# Patient Record
Sex: FEMALE | Race: WHITE | NOT HISPANIC OR LATINO | Employment: FULL TIME | URBAN - METROPOLITAN AREA
[De-identification: names, ages, dates, MRNs, and addresses within clinical notes are randomized per-mention and may not be internally consistent; named-entity substitution may affect disease eponyms.]

---

## 2017-01-03 ENCOUNTER — OFFICE VISIT (OUTPATIENT)
Dept: FAMILY MEDICINE | Facility: CLINIC | Age: 42
End: 2017-01-03
Payer: COMMERCIAL

## 2017-01-03 VITALS
SYSTOLIC BLOOD PRESSURE: 100 MMHG | HEIGHT: 64 IN | OXYGEN SATURATION: 99 % | DIASTOLIC BLOOD PRESSURE: 67 MMHG | BODY MASS INDEX: 23.56 KG/M2 | TEMPERATURE: 98.7 F | HEART RATE: 79 BPM | WEIGHT: 138 LBS

## 2017-01-03 DIAGNOSIS — R30.0 DYSURIA: Primary | ICD-10-CM

## 2017-01-03 DIAGNOSIS — N30.01 ACUTE CYSTITIS WITH HEMATURIA: ICD-10-CM

## 2017-01-03 DIAGNOSIS — R82.90 NONSPECIFIC FINDING ON EXAMINATION OF URINE: ICD-10-CM

## 2017-01-03 LAB
ALBUMIN UR-MCNC: NEGATIVE MG/DL
APPEARANCE UR: CLEAR
BACTERIA #/AREA URNS HPF: ABNORMAL /HPF
BILIRUB UR QL STRIP: NEGATIVE
COLOR UR AUTO: YELLOW
GLUCOSE UR STRIP-MCNC: 100 MG/DL
HGB UR QL STRIP: ABNORMAL
KETONES UR STRIP-MCNC: NEGATIVE MG/DL
LEUKOCYTE ESTERASE UR QL STRIP: ABNORMAL
NITRATE UR QL: POSITIVE
NON-SQ EPI CELLS #/AREA URNS LPF: ABNORMAL /LPF
PH UR STRIP: 6.5 PH (ref 5–7)
RBC #/AREA URNS AUTO: ABNORMAL /HPF (ref 0–2)
SP GR UR STRIP: 1.01 (ref 1–1.03)
URN SPEC COLLECT METH UR: ABNORMAL
UROBILINOGEN UR STRIP-ACNC: 0.2 EU/DL (ref 0.2–1)
WBC #/AREA URNS AUTO: ABNORMAL /HPF (ref 0–2)

## 2017-01-03 PROCEDURE — 87086 URINE CULTURE/COLONY COUNT: CPT | Performed by: PHYSICIAN ASSISTANT

## 2017-01-03 PROCEDURE — 81001 URINALYSIS AUTO W/SCOPE: CPT | Performed by: PHYSICIAN ASSISTANT

## 2017-01-03 PROCEDURE — 99213 OFFICE O/P EST LOW 20 MIN: CPT | Performed by: PHYSICIAN ASSISTANT

## 2017-01-03 RX ORDER — SULFAMETHOXAZOLE/TRIMETHOPRIM 800-160 MG
1 TABLET ORAL 2 TIMES DAILY
Qty: 6 TABLET | Refills: 0 | Status: SHIPPED | OUTPATIENT
Start: 2017-01-03 | End: 2017-01-06

## 2017-01-03 RX ORDER — PHENAZOPYRIDINE HYDROCHLORIDE 100 MG/1
100 TABLET, FILM COATED ORAL 3 TIMES DAILY PRN
Qty: 12 TABLET | Refills: 0 | Status: SHIPPED | OUTPATIENT
Start: 2017-01-03 | End: 2018-03-02

## 2017-01-03 NOTE — PROGRESS NOTES
SUBJECTIVE:                                                    Sis Aguirre is a 41 year old female who presents to clinic today for the following health issues:      URINARY TRACT SYMPTOMS     Onset: today     Description:   Painful urination (Dysuria): YES  Blood in urine (Hematuria): YES  Delay in urine (Hesitency): YES    Intensity: moderate    Progression of Symptoms:  constant    Accompanying Signs & Symptoms:  Fever/chills: no   Flank pain no   Nausea and vomiting: no   Any vaginal symptoms: none  Abdominal/Pelvic Pain: YES   History:   History of frequent UTI's: YES, last was 2 years ago  History of kidney stones: no   Sexually Active: YES  Possibility of pregnancy: No    Precipitating factors:   None          Therapies Tried and outcome: AZO and Cranberry juice prn     no vaginal bleeding or back pain, no f/c, n/v/d        Problem list and histories reviewed & adjusted, as indicated.  Additional history: as documented    Patient Active Problem List   Diagnosis     CARDIOVASCULAR SCREENING; LDL GOAL LESS THAN 160     Seasonal allergies     Psychosexual disorder     Major depression     Counseling for marital and partner problems     ADHD (attention deficit hyperactivity disorder)     Major depressive disorder, recurrent episode, moderate (H)     Major depressive disorder, recurrent episode, in partial or unspecified remission     Alopecia     Insomnia     Fibromyalgia     S/P hysterectomy     Past Surgical History   Procedure Laterality Date     Hc cystoscopy w dil urethral stricture/calibration       young child and adult     Orthopedic surgery       CORTISONE INJECTION RIGHT SHOULDER     Laparoscopic hysterectomy total, salpingectomy bilateral Bilateral 12/30/2015     Procedure: LAPAROSCOPIC HYSTERECTOMY TOTAL, SALPINGECTOMY BILATERAL;  Surgeon: Karely Olmedo MD;  Location:  OR     Cystoscopy N/A 12/30/2015     Procedure: CYSTOSCOPY;  Surgeon: Geronimo Yu MD;  Location:  OR      Hysterectomy, pap no longer indicated  2015       Social History   Substance Use Topics     Smoking status: Never Smoker      Smokeless tobacco: Never Used     Alcohol Use: 1.2 oz/week     2 Standard drinks or equivalent per week      Comment: 1 DRINK DAILY     Family History   Problem Relation Age of Onset     Adopted: Yes     CANCER Father 68     Esophageal cancer,      Obesity Father      Obesity Mother      Parkinsonism Father 62     Other - See Comments Brother      kidney stones     Other - See Comments Father      Blood clots/kidney stones     Asthma Mother      Arthritis Mother      Other - See Comments Mother      gallbladder         Current Outpatient Prescriptions   Medication Sig Dispense Refill     sulfamethoxazole-trimethoprim (BACTRIM DS/SEPTRA DS) 800-160 MG per tablet Take 1 tablet by mouth 2 times daily for 3 days 6 tablet 0     phenazopyridine (PYRIDIUM) 100 MG tablet Take 1 tablet (100 mg) by mouth 3 times daily as needed for urinary tract discomfort 12 tablet 0     ibuprofen (ADVIL,MOTRIN) 600 MG tablet Take 1 tablet (600 mg) by mouth every 6 hours as needed for moderate pain 180 tablet 0     ferrous sulfate (IRON) 325 (65 FE) MG tablet Take 1 tablet (325 mg) by mouth 2 times daily 60 tablet 2     ACETAMINOPHEN PO Take 1,000 mg by mouth every 6 hours as needed        Omega-3 Fatty Acids (OMEGA-3 FISH OIL PO) Take 1 g by mouth daily        Amphetamine-Dextroamphetamine (ADDERALL PO) Take 20 mg by mouth daily        venlafaxine (EFFEXOR-XR) 75 MG 24 hr capsule Take 1 capsule (75 mg) by mouth daily 30 capsule 4     clonazePAM (KLONOPIN) 1 MG tablet Take 1 tablet (1 mg) by mouth daily Take one and a half at night before bed (Patient taking differently: Take 1 mg by mouth At Bedtime ) 135 tablet 3     ValACYclovir HCl (VALTREX PO) Take 500 mg by mouth daily as needed        Calcium-Vitamin D-Vitamin K (CALCIUM SOFT CHEWS PO) Take 500 mg by mouth. Also has 500 IU of Vit D and 40 MCG of  "Vit K.        No Known Allergies    ROS:  Constitutional, HEENT, cardiovascular, pulmonary, gi and gu systems are negative, except as otherwise noted.    OBJECTIVE:                                                    /67 mmHg  Pulse 79  Temp(Src) 98.7  F (37.1  C) (Tympanic)  Ht 5' 3.5\" (1.613 m)  Wt 138 lb (62.596 kg)  BMI 24.06 kg/m2  SpO2 99%  LMP 10/05/2015 (Exact Date)  Body mass index is 24.06 kg/(m^2).  GENERAL: healthy, alert and no distress  RESP: lungs clear to auscultation - no rales, rhonchi or wheezes  CV: regular rate and rhythm, normal S1 S2, no S3 or S4, no murmur, click or rub  ABDOMEN: soft, nontender, no hepatosplenomegaly, no masses and bowel sounds normal  BACK: no CVA tenderness  PSYCH: mentation appears normal, affect normal/bright    Diagnostic Test Results:  Results for orders placed or performed in visit on 01/03/17 (from the past 24 hour(s))   *UA reflex to Microscopic and Culture ([facility] (except [place] and [place])   Result Value Ref Range    Color Urine Yellow     Appearance Urine Clear     Glucose Urine 100 (A) NEG mg/dL    Bilirubin Urine Negative NEG    Ketones Urine Negative NEG mg/dL    Specific Gravity Urine 1.010 1.003 - 1.035    Blood Urine Trace (A) NEG    pH Urine 6.5 5.0 - 7.0 pH    Protein Albumin Urine Negative NEG mg/dL    Urobilinogen Urine 0.2 0.2 - 1.0 EU/dL    Nitrite Urine Positive (A) NEG    Leukocyte Esterase Urine Small (A) NEG    Source Midstream Urine    Urine Microscopic   Result Value Ref Range    WBC Urine 5-10 (A) 0 - 2 /HPF    RBC Urine O - 2 0 - 2 /HPF    Squamous Epithelial /LPF Urine Few FEW /LPF    Bacteria Urine Moderate (A) NEG /HPF        ASSESSMENT/PLAN:                                                      1. Dysuria  - *UA reflex to Microscopic and Culture ([facility] (except [place] and [place])  - Urine Microscopic    2. Nonspecific finding on examination of urine  - " Urine Culture Aerobic Bacterial    3. Acute cystitis with hematuria  - sulfamethoxazole-trimethoprim (BACTRIM DS/SEPTRA DS) 800-160 MG per tablet; Take 1 tablet by mouth 2 times daily for 3 days  Dispense: 6 tablet; Refill: 0  - phenazopyridine (PYRIDIUM) 100 MG tablet; Take 1 tablet (100 mg) by mouth 3 times daily as needed for urinary tract discomfort  Dispense: 12 tablet; Refill: 0    Follow-Up: if new or worsening symptoms    Xander Noonan PA-C  [facility]

## 2017-01-03 NOTE — NURSING NOTE
"Chief Complaint   Patient presents with     UTI       Initial /67 mmHg  Pulse 79  Temp(Src) 98.7  F (37.1  C) (Tympanic)  Ht 5' 3.5\" (1.613 m)  Wt 138 lb (62.596 kg)  BMI 24.06 kg/m2  SpO2 99%  LMP 10/05/2015 (Exact Date) Estimated body mass index is 24.06 kg/(m^2) as calculated from the following:    Height as of this encounter: 5' 3.5\" (1.613 m).    Weight as of this encounter: 138 lb (62.596 kg).  BP completed using cuff size: large  "

## 2017-01-05 LAB
BACTERIA SPEC CULT: NORMAL
MICRO REPORT STATUS: NORMAL
SPECIMEN SOURCE: NORMAL

## 2017-01-05 NOTE — PROGRESS NOTES
Quick Note:    Sis-  Here are your recent results. Your urine culture shows a small amount of normal genital yudi, given your symptoms, please continue your medications and return if new or worsening symptoms.    If you have any questions please do not hesitate to contact our office via phone ([phone]) or you may send me a message via [employer] by clicking the contact my Care Team link.      It was a pleasure meeting you and participating in your care!    Thank you,    Xander Noonan MPH, PA-C  [address] [place] [id number]  [phone]  ______

## 2017-02-02 ENCOUNTER — TRANSFERRED RECORDS (OUTPATIENT)
Dept: HEALTH INFORMATION MANAGEMENT | Facility: CLINIC | Age: 42
End: 2017-02-02

## 2017-02-17 ENCOUNTER — TRANSFERRED RECORDS (OUTPATIENT)
Dept: HEALTH INFORMATION MANAGEMENT | Facility: CLINIC | Age: 42
End: 2017-02-17

## 2017-03-27 ENCOUNTER — TRANSFERRED RECORDS (OUTPATIENT)
Dept: HEALTH INFORMATION MANAGEMENT | Facility: CLINIC | Age: 42
End: 2017-03-27

## 2017-04-11 ENCOUNTER — RADIANT APPOINTMENT (OUTPATIENT)
Dept: MAMMOGRAPHY | Facility: CLINIC | Age: 42
End: 2017-04-11
Payer: COMMERCIAL

## 2017-04-11 DIAGNOSIS — Z12.31 VISIT FOR SCREENING MAMMOGRAM: ICD-10-CM

## 2017-04-11 PROCEDURE — G0202 SCR MAMMO BI INCL CAD: HCPCS | Mod: TC

## 2018-03-02 ENCOUNTER — OFFICE VISIT (OUTPATIENT)
Dept: FAMILY MEDICINE | Facility: CLINIC | Age: 43
End: 2018-03-02
Payer: COMMERCIAL

## 2018-03-02 VITALS
WEIGHT: 134 LBS | HEART RATE: 96 BPM | SYSTOLIC BLOOD PRESSURE: 128 MMHG | RESPIRATION RATE: 14 BRPM | OXYGEN SATURATION: 100 % | BODY MASS INDEX: 23.36 KG/M2 | DIASTOLIC BLOOD PRESSURE: 74 MMHG | TEMPERATURE: 98.1 F

## 2018-03-02 DIAGNOSIS — G47.09 OTHER INSOMNIA: ICD-10-CM

## 2018-03-02 DIAGNOSIS — B00.9 HSV (HERPES SIMPLEX VIRUS) INFECTION: Primary | ICD-10-CM

## 2018-03-02 DIAGNOSIS — F33.1 MAJOR DEPRESSIVE DISORDER, RECURRENT EPISODE, MODERATE (H): ICD-10-CM

## 2018-03-02 DIAGNOSIS — M77.11 RIGHT TENNIS ELBOW: ICD-10-CM

## 2018-03-02 PROCEDURE — 99214 OFFICE O/P EST MOD 30 MIN: CPT | Performed by: PHYSICIAN ASSISTANT

## 2018-03-02 RX ORDER — VALACYCLOVIR HYDROCHLORIDE 500 MG/1
500 TABLET, FILM COATED ORAL 2 TIMES DAILY
Qty: 24 TABLET | Refills: 5 | Status: SHIPPED | OUTPATIENT
Start: 2018-03-02 | End: 2019-06-21

## 2018-03-02 RX ORDER — VALACYCLOVIR HYDROCHLORIDE 1 G/1
2000 TABLET, FILM COATED ORAL 2 TIMES DAILY
Qty: 12 TABLET | Refills: 5 | Status: CANCELLED | OUTPATIENT
Start: 2018-03-02 | End: 2018-03-03

## 2018-03-02 NOTE — ASSESSMENT & PLAN NOTE
- Currently sees a NP for her effexor, klonopin and adderall, is thinking of transferring care.  Discussed we would need a copy of her medications in order to do that but I could prescribe them for her once those are received.  She also notes problems with libido which has possibly worsened since her hysterectomy per her boyfriend but she thinks it started before that.  She has talked about this with her other provider who believes it is probably more psychosocial in nature.  DAP and phq-9 updated today.

## 2018-03-02 NOTE — NURSING NOTE
"No chief complaint on file.      Initial /74  Pulse 96  Temp 98.1  F (36.7  C) (Tympanic)  Resp 14  Wt 134 lb (60.8 kg)  LMP 10/05/2015 (Exact Date)  SpO2 100%  BMI 23.36 kg/m2 Estimated body mass index is 23.36 kg/(m^2) as calculated from the following:    Height as of 1/3/17: 5' 3.5\" (1.613 m).    Weight as of this encounter: 134 lb (60.8 kg).  Medication Reconciliation: complete    "

## 2018-03-02 NOTE — PROGRESS NOTES
SUBJECTIVE:   Sis Aguirre is a 42 year old female who presents to clinic today for the following health issues:    New Patient/Transfer of Care  Depression Followup    Status since last visit: up and down but stable for the most part    See PHQ-9 for current symptoms.  Other associated symptoms: None    Complicating factors:   Significant life event:  No   Current substance abuse:  None  Anxiety or Panic symptoms:  No    PHQ-9 10/28/2015 11/13/2015 3/2/2018   Total Score 4 4 1   Q9: Suicide Ideation Not at all Not at all Not at all     PHQ-9  English  PHQ-9   Any Language  Suicide Assessment Five-step Evaluation and Treatment (SAFE-T)      Follow up on valcyclovir      Amount of exercise or physical activity: 2-3 days/week for an average of 30-45 minutes    Problems taking medications regularly: No    Medication side effects: none    Diet: regular (no restrictions)    Reviewed and updated as needed this visit by clinical staff  Tobacco  Allergies  Meds  Problems  Med Hx  Surg Hx  Fam Hx  Soc Hx        Reviewed and updated as needed this visit by Provider  Tobacco  Allergies  Meds  Problems  Med Hx  Surg Hx  Fam Hx  Soc Hx        Additional complaints: None    HPI additional notes: Sis presents today with   Chief Complaint   Patient presents with     Depression     Establish Care        ROS:  C: Negative for fever, chills, recent change in weight  Skin: Negative for worrisome rashes or lesions  ENT: Negative for ear, mouth and throat problems  Resp: Negative for significant cough or SOB  CV: Negative for chest pain or peripheral edema  : POSITIVE for history of genital herpes. Negative for dysuria and vaginal discharge  MS: Positive for right elbow  pain  PSYCHIATRIC: POSITIVE for insomnia, Hx anxiety or Hx depression. Negative for thoughts of self harm or suicide  ROS all other systems negative.    Chart Review:  History   Smoking Status     Former Smoker   Smokeless Tobacco     Never Used      PHQ-9 SCORE 10/28/2015 11/13/2015 3/2/2018   Total Score - - -   Total Score 4 4 1   Some encounter information is confidential and restricted. Go to Review Flowsheets activity to see all data.     JOHN-7 SCORE 10/28/2015 11/13/2015   Total Score 4 1     Recent Labs   Lab Test  11/13/15   0737  01/27/15   1158  09/12/12   0901   LDL   --    --   104   HDL   --    --   49*   TRIG   --    --   144   ALT   --   18   --    CR   --   0.57   --    GFRESTIMATED   --   >90  Non  GFR Calc     --    GFRESTBLACK   --   >90   GFR Calc     --    POTASSIUM   --   3.4   --    TSH  1.70  0.98   --       BP Readings from Last 3 Encounters:   03/02/18 128/74   01/03/17 100/67   12/31/15 112/59    Wt Readings from Last 3 Encounters:   03/02/18 134 lb (60.8 kg)   01/03/17 138 lb (62.6 kg)   12/30/15 138 lb (62.6 kg)                  Patient Active Problem List   Diagnosis     Seasonal allergies     Psychosexual disorder     ADHD (attention deficit hyperactivity disorder)     Major depressive disorder, recurrent episode, moderate (H)     Alopecia     Other insomnia     Fibromyalgia     HSV (herpes simplex virus) infection     Right tennis elbow     Past Surgical History:   Procedure Laterality Date     CYSTOSCOPY N/A 12/30/2015    Procedure: CYSTOSCOPY;  Surgeon: Geronimo Yu MD;  Location:  OR      CYSTOSCOPY W DIL URETHRAL STRICTURE/CALIBRATION      young child and adult     HYSTERECTOMY, PAP NO LONGER INDICATED  12/30/2015    Due to fibroids, still has ovaries.     LAPAROSCOPIC HYSTERECTOMY TOTAL, SALPINGECTOMY BILATERAL Bilateral 12/30/2015    Procedure: LAPAROSCOPIC HYSTERECTOMY TOTAL, SALPINGECTOMY BILATERAL;  Surgeon: Karely Olmedo MD;  Location:  OR     ORTHOPEDIC SURGERY      CORTISONE INJECTION RIGHT SHOULDER     Problem list, Medication list, Allergies, Medical/Social/Surg hx reviewed in [facility], updated as appropriate.   OBJECTIVE:                                                     /74  Pulse 96  Temp 98.1  F (36.7  C) (Tympanic)  Resp 14  Wt 134 lb (60.8 kg)  LMP 10/05/2015 (Exact Date)  SpO2 100%  BMI 23.36 kg/m2  Body mass index is 23.36 kg/(m^2).  GENERAL: healthy, alert, in no acute distress  EYES: Grossly normal to inspection, EOMI, PERRL  HENT: Mucous mebranes moist.  MS: Tenderness to palpation of the right lateral epicondyle and with resisted wrist extension   SKIN: no suspicious lesions, no rashes  PSYCH:Mental Status Exam  Behavior: cooperative, pleasant and calm  Speech: slowed  Mood: description consistent with euthymia  Affect: Appropriate/mood-congruent  Thought Processes: Logical  Thought Content: no evidence of suicidal or homicidal ideation and no overt psychosis  Insight: Good  Judgment: Good      Diagnostic test results: none      ASSESSMENT/PLAN:                                                            ICD-10-CM    1. HSV (herpes simplex virus) infection B00.9 valACYclovir (VALTREX) 500 MG tablet   2. Right tennis elbow M77.11    3. Other insomnia G47.09    4. Major depressive disorder, recurrent episode, moderate (H) F33.1      Major depressive disorder, recurrent episode, moderate (H)  - Currently sees a NP for her effexor, klonopin and adderall, is thinking of transferring care.  Discussed we would need a copy of her medications in order to do that but I could prescribe them for her once those are received.  She also notes problems with libido which has possibly worsened since her hysterectomy per her boyfriend but she thinks it started before that.  She has talked about this with her other provider who believes it is probably more psychosocial in nature.  DAP and phq-9 updated today.    HSV (herpes simplex virus) infection  -History of HSV II, takes valtrex prn for flares, needs refill which was provided today.    Right tennis elbow  - Wearing brace and doing PT exercises from her chiropractor which seem to be helping.  Has asked for an  ergonomic evaluation of her workspace at work with a note from her chiropractor, will let me know if she needs another note.  Discussed strengthening exercises she can use once pain is improving.    Please see patient instructions for treatment details.    Follow up as needed.    Ruchi Chase PA-C  [facility]

## 2018-03-02 NOTE — MR AVS SNAPSHOT
After Visit Summary   3/2/2018    Sis Aguirre    MRN: [id number]           Patient Information     Date Of Birth          1975        Visit Information        Provider Department      3/2/2018 11:10 AM Ruchi Chase PA-C [facility]        Today's Diagnoses     HSV (herpes simplex virus) infection    -  1    Right tennis elbow        Other insomnia        Major depressive disorder, recurrent episode, moderate (H)          Care Instructions                 Lateral Epicondylitis (Tennis Elbow)   Rehabilitation Exercises   You may do the stretching exercises right away. You may do the strengthening exercises when stretching is nearly painless.   Stretching exercises     Wrist range of motion: Bend your wrist forward and backward as far as you can. Do 3 sets of 10.     Pronation and supination of the forearm: With your elbow bent 90 , turn your palm upward and hold for 5 seconds. Slowly turn your palm downward and hold for 5 seconds. Make sure you keep your elbow at your side and bent 90  throughout this exercise. Do 3 sets of 10.     Elbow range of motion: Gently bring your palm up toward your shoulder and bend your elbow as far as you can. Then straighten your elbow as far as you can 10 times. Do 3 sets of 10.   Strengthening exercises     Wrist flexion exercise: Hold a can or hammer handle in your hand with your palm facing up. Bend your wrist upward. Slowly lower the weight and return to the starting position. Do 3 sets of 10. Gradually increase the weight of the can or weight you are holding.     Wrist extension exercise: Hold a soup can or hammer handle in your hand with your palm facing down. Slowly bend your wrist upward. Slowly lower the weight down into the starting position. Do 3 sets of 10. Gradually increase the weight of the object you are holding.     Wrist radial deviation strengthening: Put your wrist in the sideways position with your  thumb up. Hold a can of soup or a hammer handle and gently bend your wrist up, with the thumb reaching toward the ceiling. Slowly lower to the starting position. Do not move your forearm throughout this exercise. Do 3 sets of 10.     Forearm pronation and supination strengthening: Hold a soup can or hammer handle in your hand and bend your elbow 90 . Slowly rotate your hand with your palm upward and then palm down. Do 3 sets of 10.     Wrist extension (with broom handle): Stand up and hold a broom handle in both hands. With your arms at shoulder level, elbows straight and palms down, roll the broom handle backward in your hand as if you are reeling something in using a broom handle. Do 3 sets of 10.   Written by Sis Ferro, MS, PT, for [employer].   Published by [employer].   This content is reviewed periodically and is subject to change as new health information becomes available. The information is intended to inform and educate and is not a replacement for medical evaluation, advice, diagnosis or treatment by a healthcare professional.   Sports Medicine Advisor 2003.1 Index  Sports Medicine Advisor 2003.1 Credits   Copyright   2003 [employer]. All rights reserved.                      Follow-ups after your visit        Who to contact     If you have questions or need follow up information about today's clinic visit or your schedule please contact [facility] directly at [phone].  Normal or non-critical lab and imaging results will be communicated to you by MyChart, letter or phone within 4 business days after the clinic has received the results. If you do not hear from us within 7 days, please contact the clinic through MyChart or phone. If you have a critical or abnormal lab result, we will notify you by phone as soon as possible.  Submit refill requests through [employer] or call your pharmacy and they will forward the  refill request to us. Please allow 3 business days for your refill to be completed.          Additional Information About Your Visit        [employer] Information     [employer] gives you secure access to your electronic health record. If you see a primary care provider, you can also send messages to your care team and make appointments. If you have questions, please call your primary care clinic.  If you do not have a primary care provider, please call [phone] and they will assist you.        Care EveryWhere ID     This is your Care EveryWhere ID. This could be used by other organizations to access your [place] medical records  [id number]        Your Vitals Were     Pulse Temperature Respirations Last Period Pulse Oximetry BMI (Body Mass Index)    96 98.1  F (36.7  C) (Tympanic) 14 10/05/2015 (Exact Date) 100% 23.36 kg/m2       Blood Pressure from Last 3 Encounters:   03/02/18 128/74   01/03/17 100/67   12/31/15 112/59    Weight from Last 3 Encounters:   03/02/18 134 lb (60.8 kg)   01/03/17 138 lb (62.6 kg)   12/30/15 138 lb (62.6 kg)              We Performed the Following     DEPRESSION ACTION PLAN (DAP)          Today's Medication Changes          These changes are accurate as of 3/2/18 11:43 AM.  If you have any questions, ask your nurse or doctor.               These medicines have changed or have updated prescriptions.        Dose/Directions    clonazePAM 1 MG tablet   Commonly known as:  klonoPIN   This may have changed:    - when to take this  - additional instructions   Used for:  Major depressive disorder, recurrent episode, in partial or unspecified remission        Dose:  1 mg   Take 1 tablet (1 mg) by mouth daily Take one and a half at night before bed   Quantity:  135 tablet   Refills:  3       ibuprofen 600 MG tablet   Commonly known as:  ADVIL/MOTRIN   This may have changed:  when to take this   Used for:  Fibromyalgia        Dose:  600 mg   Take 1 tablet (600 mg) by mouth every 6 hours as needed  for moderate pain   Quantity:  180 tablet   Refills:  0       valACYclovir 500 MG tablet   Commonly known as:  VALTREX   This may have changed:    - medication strength  - when to take this  - reasons to take this   Used for:  HSV (herpes simplex virus) infection   Changed by:  Ruchi Chase PA-C        Dose:  500 mg   Take 1 tablet (500 mg) by mouth 2 times daily for 3 days   Quantity:  24 tablet   Refills:  5            Where to get your medicines      These medications were sent to [facility]/pharmacy #[id number] - [place], [place] - [address]  [address] [address]     Phone:  [phone]     valACYclovir 500 MG tablet                Primary Care Provider Fax #    Physician No Ref-Primary [phone]       No address on file        Equal Access to Services     ANTONINO AVILA : Gavino ornelaso Sovenkata, waaxda luqadaha, qaybta kaalmada adeegyada, yvette arevalo . So [facility] [phone].    ATENCIÓN: Si habla español, tiene a orellana disposición servicios gratuitos de asistencia lingüística. [facility] [phone].    We comply with applicable federal civil rights laws and [place] laws. We do not discriminate on the basis of race, color, national origin, age, disability, sex, sexual orientation, or gender identity.            Thank you!     Thank you for choosing [facility]  for your care. Our goal is always to provide you with excellent care. Hearing back from our patients is one way we can continue to improve our services. Please take a few minutes to complete the written survey that you may receive in the mail after your visit with us. Thank you!             Your Updated Medication List - Protect others around you: Learn how to safely use, store and throw away your medicines at www.disposemymeds.org.          This list is accurate as of 3/2/18 11:43 AM.  Always use your most recent med list.                   Brand Name  Dispense Instructions for use Diagnosis    ACETAMINOPHEN PO      Take 1,000 mg by mouth as needed        ADDERALL PO      Take 20 mg by mouth daily        CALCIUM SOFT CHEWS PO      Take 500 mg by mouth. Also has 500 IU of Vit D and 40 MCG of Vit K.        clonazePAM 1 MG tablet    klonoPIN    135 tablet    Take 1 tablet (1 mg) by mouth daily Take one and a half at night before bed    Major depressive disorder, recurrent episode, in partial or unspecified remission       ferrous sulfate 325 (65 FE) MG tablet    IRON    60 tablet    Take 1 tablet (325 mg) by mouth 2 times daily    S/P hysterectomy       ibuprofen 600 MG tablet    ADVIL/MOTRIN    180 tablet    Take 1 tablet (600 mg) by mouth every 6 hours as needed for moderate pain    Fibromyalgia       OMEGA-3 FISH OIL PO      Take 1 g by mouth daily        valACYclovir 500 MG tablet    VALTREX    24 tablet    Take 1 tablet (500 mg) by mouth 2 times daily for 3 days    HSV (herpes simplex virus) infection       venlafaxine 75 MG 24 hr capsule    EFFEXOR-XR    30 capsule    Take 1 capsule (75 mg) by mouth daily    Depression

## 2018-03-02 NOTE — ASSESSMENT & PLAN NOTE
- Wearing brace and doing PT exercises from her chiropractor which seem to be helping.  Has asked for an ergonomic evaluation of her workspace at work with a note from her chiropractor, will let me know if she needs another note.  Discussed strengthening exercises she can use once pain is improving.

## 2018-03-02 NOTE — PATIENT INSTRUCTIONS
Lateral Epicondylitis (Tennis Elbow)   Rehabilitation Exercises   You may do the stretching exercises right away. You may do the strengthening exercises when stretching is nearly painless.   Stretching exercises     Wrist range of motion: Bend your wrist forward and backward as far as you can. Do 3 sets of 10.     Pronation and supination of the forearm: With your elbow bent 90 , turn your palm upward and hold for 5 seconds. Slowly turn your palm downward and hold for 5 seconds. Make sure you keep your elbow at your side and bent 90  throughout this exercise. Do 3 sets of 10.     Elbow range of motion: Gently bring your palm up toward your shoulder and bend your elbow as far as you can. Then straighten your elbow as far as you can 10 times. Do 3 sets of 10.   Strengthening exercises     Wrist flexion exercise: Hold a can or hammer handle in your hand with your palm facing up. Bend your wrist upward. Slowly lower the weight and return to the starting position. Do 3 sets of 10. Gradually increase the weight of the can or weight you are holding.     Wrist extension exercise: Hold a soup can or hammer handle in your hand with your palm facing down. Slowly bend your wrist upward. Slowly lower the weight down into the starting position. Do 3 sets of 10. Gradually increase the weight of the object you are holding.     Wrist radial deviation strengthening: Put your wrist in the sideways position with your thumb up. Hold a can of soup or a hammer handle and gently bend your wrist up, with the thumb reaching toward the ceiling. Slowly lower to the starting position. Do not move your forearm throughout this exercise. Do 3 sets of 10.     Forearm pronation and supination strengthening: Hold a soup can or hammer handle in your hand and bend your elbow 90 . Slowly rotate your hand with your palm upward and then palm down. Do 3 sets of 10.     Wrist extension (with broom handle): Stand up and hold a broom handle in  both hands. With your arms at shoulder level, elbows straight and palms down, roll the broom handle backward in your hand as if you are reeling something in using a broom handle. Do 3 sets of 10.   Written by Sis Ferro MS, PT, for [employer].   Published by [employer].   This content is reviewed periodically and is subject to change as new health information becomes available. The information is intended to inform and educate and is not a replacement for medical evaluation, advice, diagnosis or treatment by a healthcare professional.   Sports Medicine Advisor 2003.1 Index  Sports Medicine Advisor 2003.1 Credits   Copyright   2003 [employer]. All rights reserved.

## 2018-03-03 ASSESSMENT — PATIENT HEALTH QUESTIONNAIRE - PHQ9: SUM OF ALL RESPONSES TO PHQ QUESTIONS 1-9: 1

## 2018-06-05 ENCOUNTER — RADIANT APPOINTMENT (OUTPATIENT)
Dept: MAMMOGRAPHY | Facility: CLINIC | Age: 43
End: 2018-06-05
Attending: PHYSICIAN ASSISTANT
Payer: COMMERCIAL

## 2018-06-05 DIAGNOSIS — Z12.31 VISIT FOR SCREENING MAMMOGRAM: ICD-10-CM

## 2018-06-05 PROCEDURE — 77067 SCR MAMMO BI INCL CAD: CPT | Mod: TC

## 2018-07-27 ENCOUNTER — TRANSFERRED RECORDS (OUTPATIENT)
Dept: HEALTH INFORMATION MANAGEMENT | Facility: CLINIC | Age: 43
End: 2018-07-27

## 2019-01-16 ENCOUNTER — TRANSFERRED RECORDS (OUTPATIENT)
Dept: HEALTH INFORMATION MANAGEMENT | Facility: CLINIC | Age: 44
End: 2019-01-16

## 2019-02-21 ENCOUNTER — OFFICE VISIT (OUTPATIENT)
Dept: FAMILY MEDICINE | Facility: CLINIC | Age: 44
End: 2019-02-21
Payer: COMMERCIAL

## 2019-02-21 VITALS
HEART RATE: 82 BPM | DIASTOLIC BLOOD PRESSURE: 70 MMHG | HEIGHT: 64 IN | WEIGHT: 135 LBS | SYSTOLIC BLOOD PRESSURE: 114 MMHG | BODY MASS INDEX: 23.05 KG/M2 | TEMPERATURE: 98.1 F | RESPIRATION RATE: 16 BRPM

## 2019-02-21 DIAGNOSIS — F33.1 MAJOR DEPRESSIVE DISORDER, RECURRENT EPISODE, MODERATE (H): ICD-10-CM

## 2019-02-21 DIAGNOSIS — L85.3 DRY SKIN: ICD-10-CM

## 2019-02-21 DIAGNOSIS — R82.90 ABNORMAL URINE ODOR: ICD-10-CM

## 2019-02-21 DIAGNOSIS — N89.8 VAGINAL DISCHARGE: Primary | ICD-10-CM

## 2019-02-21 DIAGNOSIS — B37.31 VULVOVAGINAL CANDIDIASIS: ICD-10-CM

## 2019-02-21 LAB
ALBUMIN UR-MCNC: NEGATIVE MG/DL
APPEARANCE UR: CLEAR
BILIRUB UR QL STRIP: NEGATIVE
COLOR UR AUTO: YELLOW
GLUCOSE UR STRIP-MCNC: NEGATIVE MG/DL
HGB UR QL STRIP: NEGATIVE
KETONES UR STRIP-MCNC: NEGATIVE MG/DL
LEUKOCYTE ESTERASE UR QL STRIP: NEGATIVE
NITRATE UR QL: NEGATIVE
PH UR STRIP: 7 PH (ref 5–7)
SOURCE: NORMAL
SP GR UR STRIP: 1.01 (ref 1–1.03)
SPECIMEN SOURCE: ABNORMAL
UROBILINOGEN UR STRIP-ACNC: 0.2 EU/DL (ref 0.2–1)
WET PREP SPEC: ABNORMAL

## 2019-02-21 PROCEDURE — 81003 URINALYSIS AUTO W/O SCOPE: CPT | Performed by: PHYSICIAN ASSISTANT

## 2019-02-21 PROCEDURE — 99214 OFFICE O/P EST MOD 30 MIN: CPT | Performed by: PHYSICIAN ASSISTANT

## 2019-02-21 PROCEDURE — 87210 SMEAR WET MOUNT SALINE/INK: CPT | Performed by: PHYSICIAN ASSISTANT

## 2019-02-21 RX ORDER — FLUCONAZOLE 150 MG/1
150 TABLET ORAL ONCE
Qty: 2 TABLET | Refills: 0 | Status: SHIPPED | OUTPATIENT
Start: 2019-02-21 | End: 2019-02-21

## 2019-02-21 ASSESSMENT — ENCOUNTER SYMPTOMS
RESPIRATORY NEGATIVE: 1
PSYCHIATRIC NEGATIVE: 1
EYES NEGATIVE: 1
GASTROINTESTINAL NEGATIVE: 1
CONSTITUTIONAL NEGATIVE: 1
CARDIOVASCULAR NEGATIVE: 1
MUSCULOSKELETAL NEGATIVE: 1
NEUROLOGICAL NEGATIVE: 1

## 2019-02-21 ASSESSMENT — MIFFLIN-ST. JEOR: SCORE: 1239.42

## 2019-02-21 ASSESSMENT — PATIENT HEALTH QUESTIONNAIRE - PHQ9: SUM OF ALL RESPONSES TO PHQ QUESTIONS 1-9: 9

## 2019-02-21 NOTE — RESULT ENCOUNTER NOTE
Sis    Your lab tests are complete and I have reviewed the results.     -Urine is normal.    If you have any questions or concerns, please feel free to call or send a [employer] message.    Sincerely,  Jeancarlos Alanis PA-C

## 2019-02-21 NOTE — PATIENT INSTRUCTIONS
I will contact you with the urine results when they are complete.     Proper skin care from [facility]:     -Eliminate harsh soaps as they strip the natural oils from the skin, often resulting in dry itchy skin ( i.e. Dial, Zest, [place] Spring)  -Use mild soaps such as Cetaphil or Dove Sensitive Skin in the shower. You do not need to use soap on arms, legs, and trunk every time you shower unless visibly soiled.   -Avoid hot or cold showers.  -After showering, lightly dry off and apply moisturizing within 2-3 minutes. This will help trap moisture in the skin.   -Aggressive use of a moisturizer at least 1-2 times a day to the entire body (including -Vanicream, Cetaphil, Aquaphor or Cerave) and moisturize hands after every washing.  -We recommend using moisturizers that come in a tub that needs to be scooped out, not a pump. This has more of an oil base. It will hold moisture in your skin much better than a water base moisturizer. The above recommended are non-pore clogging.

## 2019-02-21 NOTE — PROGRESS NOTES
SUBJECTIVE:   Sis Aguirre is a 43 year old female who presents to clinic today for the following health issues:      Vaginal Symptoms      Duration: 3 weeks    Description  itching and odor    Intensity:  mild    Accompanying signs and symptoms (fever/dysuria/abdominal or back pain): None    History  Sexually active: yes, single partner, contraception - hysterectomy  Possibility of pregnancy: No  Recent antibiotic use: no     Precipitating or alleviating factors: None    Therapies tried and outcome: none   Outcome: na    Her partner also noticed she has an odor to her urine  She has a new dry rash on her left chest and neck    Problem list and histories reviewed & adjusted, as indicated.  Additional history: as documented    Patient Active Problem List   Diagnosis     Seasonal allergies     Psychosexual disorder     ADHD (attention deficit hyperactivity disorder)     Major depressive disorder, recurrent episode, moderate (H)     Alopecia     Other insomnia     Fibromyalgia     HSV (herpes simplex virus) infection     Right tennis elbow     Past Surgical History:   Procedure Laterality Date     CYSTOSCOPY N/A 12/30/2015    Procedure: CYSTOSCOPY;  Surgeon: Geronimo Yu MD;  Location:  OR      CYSTOSCOPY W DIL URETHRAL STRICTURE/CALIBRATION      young child and adult     HYSTERECTOMY, PAP NO LONGER INDICATED  12/30/2015    Due to fibroids, still has ovaries.     LAPAROSCOPIC HYSTERECTOMY TOTAL, SALPINGECTOMY BILATERAL Bilateral 12/30/2015    Procedure: LAPAROSCOPIC HYSTERECTOMY TOTAL, SALPINGECTOMY BILATERAL;  Surgeon: Karely Olmedo MD;  Location:  OR     ORTHOPEDIC SURGERY      CORTISONE INJECTION RIGHT SHOULDER       Social History     Tobacco Use     Smoking status: Former Smoker     Smokeless tobacco: Never Used   Substance Use Topics     Alcohol use: Yes     Alcohol/week: 1.2 oz     Types: 2 Standard drinks or equivalent per week     Comment: 1 DRINK DAILY     Family History    Adopted: Yes   Problem Relation Age of Onset     Obesity Mother      Asthma Mother      Arthritis Mother      Other - See Comments Mother         gallbladder     Cancer Father 68        Esophageal cancer,      Obesity Father      Parkinsonism Father 62     Other - See Comments Father         Blood clots/kidney stones     Other - See Comments Brother         kidney stones         Current Outpatient Medications   Medication Sig Dispense Refill     ACETAMINOPHEN PO Take 1,000 mg by mouth as needed        Amphetamine-Dextroamphetamine (ADDERALL PO) Take 20 mg by mouth daily        Calcium-Vitamin D-Vitamin K (CALCIUM SOFT CHEWS PO) Take 500 mg by mouth. Also has 500 IU of Vit D and 40 MCG of Vit K.        clonazePAM (KLONOPIN) 1 MG tablet Take 1 tablet (1 mg) by mouth daily Take one and a half at night before bed (Patient taking differently: Take 1 mg by mouth At Bedtime ) 135 tablet 3     ferrous sulfate (IRON) 325 (65 FE) MG tablet Take 1 tablet (325 mg) by mouth 2 times daily 60 tablet 2     fluconazole (DIFLUCAN) 150 MG tablet Take 1 tablet (150 mg) by mouth once for 1 dose If no improvement in 3 days - take second dose 2 tablet 0     ibuprofen (ADVIL,MOTRIN) 600 MG tablet Take 1 tablet (600 mg) by mouth every 6 hours as needed for moderate pain (Patient taking differently: Take 600 mg by mouth as needed for moderate pain ) 180 tablet 0     Omega-3 Fatty Acids (OMEGA-3 FISH OIL PO) Take 1 g by mouth daily        venlafaxine (EFFEXOR-XR) 75 MG 24 hr capsule Take 1 capsule (75 mg) by mouth daily 30 capsule 4     valACYclovir (VALTREX) 500 MG tablet Take 1 tablet (500 mg) by mouth 2 times daily for 3 days 24 tablet 5     No Known Allergies    Reviewed and updated as needed this visit by clinical staff  Tobacco  Allergies  Meds  Problems       Reviewed and updated as needed this visit by Provider  Problems         Review of Systems   Constitutional: Negative.    HENT: Negative.    Eyes: Negative.   "  Respiratory: Negative.    Cardiovascular: Negative.    Gastrointestinal: Negative.    Musculoskeletal: Negative.    Skin: Positive for rash.   Neurological: Negative.    Psychiatric/Behavioral: Negative.        OBJECTIVE:     /70 (Cuff Size: Adult Regular)   Pulse 82   Temp 98.1  F (36.7  C) (Tympanic)   Resp 16   Ht 1.613 m (5' 3.5\")   Wt 61.2 kg (135 lb)   LMP 10/05/2015 (Exact Date)   BMI 23.54 kg/m    Body mass index is 23.54 kg/m .    Physical Exam   Constitutional: She is oriented to person, place, and time. She appears well-developed and well-nourished. No distress.   HENT:   Head: Normocephalic.   Right Ear: External ear normal.   Left Ear: External ear normal.   Nose: Nose normal.   Eyes: Conjunctivae and EOM are normal.   Neck: Normal range of motion.   Pulmonary/Chest: Effort normal.   Neurological: She is alert and oriented to person, place, and time.   Skin: Skin is warm and dry. Rash (skin is dry over the left neck and upper chest, no erythema, no lesions) noted. She is not diaphoretic.   Psychiatric: She has a normal mood and affect. Judgment normal.       Results for orders placed or performed in visit on 02/21/19 (from the past 24 hour(s))   Wet prep   Result Value Ref Range    Specimen Description Vagina     Wet Prep Yeast seen (A)     Wet Prep No Trichomonas seen     Wet Prep No clue cells seen        ASSESSMENT/PLAN:       ICD-10-CM    1. Vaginal discharge N89.8 Wet prep   2. Vulvovaginal candidiasis B37.3 fluconazole (DIFLUCAN) 150 MG tablet   3. Abnormal urine odor R82.90 UA reflex to Microscopic   4. Major depressive disorder, recurrent episode, moderate (H) F33.1    5. Dry skin L85.3         Return in about 4 weeks (around 3/21/2019) for Physical (Preventive Care).    Patient Instructions   I will contact you with the urine results when they are complete.     Proper skin care from [facility]:     -Eliminate harsh soaps as they strip the natural oils from the skin, " often resulting in dry itchy skin ( i.e. Dial, Zest, [place] Spring)  -Use mild soaps such as Cetaphil or Dove Sensitive Skin in the shower. You do not need to use soap on arms, legs, and trunk every time you shower unless visibly soiled.   -Avoid hot or cold showers.  -After showering, lightly dry off and apply moisturizing within 2-3 minutes. This will help trap moisture in the skin.   -Aggressive use of a moisturizer at least 1-2 times a day to the entire body (including -Vanicream, Cetaphil, Aquaphor or Cerave) and moisturize hands after every washing.  -We recommend using moisturizers that come in a tub that needs to be scooped out, not a pump. This has more of an oil base. It will hold moisture in your skin much better than a water base moisturizer. The above recommended are non-pore clogging.        Jeancarlos Alanis PA-C  [facility]

## 2019-06-21 DIAGNOSIS — B00.9 HSV (HERPES SIMPLEX VIRUS) INFECTION: ICD-10-CM

## 2019-06-24 RX ORDER — VALACYCLOVIR HYDROCHLORIDE 500 MG/1
500 TABLET, FILM COATED ORAL 2 TIMES DAILY
Qty: 24 TABLET | Refills: 1 | Status: SHIPPED | OUTPATIENT
Start: 2019-06-24 | End: 2019-08-06

## 2019-06-24 NOTE — TELEPHONE ENCOUNTER
"Requested Prescriptions   Pending Prescriptions Disp Refills     valACYclovir (VALTREX) 500 MG tablet [Pharmacy Med Name: VALACYCLOVIR  MG TABLET]  Last Written Prescription Date:  3/2/2018 END: 3/5/2018  Last Fill Quantity: 24 tablet,  # refills: 5   Last office visit: 2/21/2019 with prescribing provider:  Zeke   Future Office Visit:   Next 5 appointments (look out 90 days)    Jun 26, 2019  3:50 PM CDT  PHYSICAL with Michelle Alanis PA-C  [facility] ([facility] [address] [place] [id number]  [phone]          24 tablet 1     Sig: TAKE 1 TABLET (500 MG) BY MOUTH 2 TIMES DAILY FOR 3 DAYS       Antivirals for Herpes Protocol Failed - 6/21/2019  5:07 PM        Failed - Normal serum creatinine on file in past 12 months     Recent Labs   Lab Test 01/27/15  1158   CR 0.57             Passed - Patient is age 12 or older        Passed - Recent (12 mo) or future (30 days) visit within the authorizing provider's specialty     Patient had office visit in the last 12 months or has a visit in the next 30 days with authorizing provider or within the authorizing provider's specialty.  See \"Patient Info\" tab in inbasket, or \"Choose Columns\" in Meds & Orders section of the refill encounter.              Passed - Medication is active on med list           "

## 2019-06-26 ENCOUNTER — OFFICE VISIT (OUTPATIENT)
Dept: FAMILY MEDICINE | Facility: CLINIC | Age: 44
End: 2019-06-26
Payer: COMMERCIAL

## 2019-06-26 VITALS
DIASTOLIC BLOOD PRESSURE: 60 MMHG | OXYGEN SATURATION: 98 % | HEART RATE: 82 BPM | SYSTOLIC BLOOD PRESSURE: 110 MMHG | BODY MASS INDEX: 22.36 KG/M2 | RESPIRATION RATE: 20 BRPM | HEIGHT: 64 IN | WEIGHT: 131 LBS

## 2019-06-26 DIAGNOSIS — Z00.00 ENCOUNTER FOR ROUTINE ADULT HEALTH EXAMINATION WITHOUT ABNORMAL FINDINGS: Primary | ICD-10-CM

## 2019-06-26 DIAGNOSIS — A63.0 GENITAL WARTS: ICD-10-CM

## 2019-06-26 PROCEDURE — 56501 DESTROY VULVA LESIONS SIM: CPT | Performed by: PHYSICIAN ASSISTANT

## 2019-06-26 PROCEDURE — 99396 PREV VISIT EST AGE 40-64: CPT | Mod: 25 | Performed by: PHYSICIAN ASSISTANT

## 2019-06-26 ASSESSMENT — PATIENT HEALTH QUESTIONNAIRE - PHQ9
10. IF YOU CHECKED OFF ANY PROBLEMS, HOW DIFFICULT HAVE THESE PROBLEMS MADE IT FOR YOU TO DO YOUR WORK, TAKE CARE OF THINGS AT HOME, OR GET ALONG WITH OTHER PEOPLE: NOT DIFFICULT AT ALL
SUM OF ALL RESPONSES TO PHQ QUESTIONS 1-9: 4
SUM OF ALL RESPONSES TO PHQ QUESTIONS 1-9: 4

## 2019-06-26 ASSESSMENT — ENCOUNTER SYMPTOMS
GASTROINTESTINAL NEGATIVE: 1
CARDIOVASCULAR NEGATIVE: 1
ROS SKIN COMMENTS: AS IN HPI
CONSTITUTIONAL NEGATIVE: 1
RESPIRATORY NEGATIVE: 1
MUSCULOSKELETAL NEGATIVE: 1
EYES NEGATIVE: 1
NEUROLOGICAL NEGATIVE: 1
PSYCHIATRIC NEGATIVE: 1

## 2019-06-26 ASSESSMENT — ANXIETY QUESTIONNAIRES
1. FEELING NERVOUS, ANXIOUS, OR ON EDGE: NOT AT ALL
GAD7 TOTAL SCORE: 0
7. FEELING AFRAID AS IF SOMETHING AWFUL MIGHT HAPPEN: NOT AT ALL
6. BECOMING EASILY ANNOYED OR IRRITABLE: NOT AT ALL
5. BEING SO RESTLESS THAT IT IS HARD TO SIT STILL: NOT AT ALL
GAD7 TOTAL SCORE: 0
GAD7 TOTAL SCORE: 0
4. TROUBLE RELAXING: NOT AT ALL
7. FEELING AFRAID AS IF SOMETHING AWFUL MIGHT HAPPEN: NOT AT ALL
2. NOT BEING ABLE TO STOP OR CONTROL WORRYING: NOT AT ALL
3. WORRYING TOO MUCH ABOUT DIFFERENT THINGS: NOT AT ALL

## 2019-06-26 ASSESSMENT — MIFFLIN-ST. JEOR: SCORE: 1216.27

## 2019-06-26 NOTE — PROGRESS NOTES
SUBJECTIVE:   CC: Sis Aguirre is an 44 year old woman who presents for preventive health visit.     Healthy Habits:     Getting at least 3 servings of Calcium per day:  Yes    Bi-annual eye exam:  NO    Dental care twice a year:  Yes    Sleep apnea or symptoms of sleep apnea:  None    Diet:  Regular (no restrictions)    Frequency of exercise:  2-3 days/week    Duration of exercise:  Less than 15 minutes    Taking medications regularly:  Yes    Medication side effects:  None    PHQ-2 Total Score: 0    Additional concerns today:  Yes          WART(S)      Onset: 1-2 months    Description (location/number): single genital wart left labia    Accompanying signs and symptoms: Painful: no     History: prior warts: YES    Therapies tried and outcome: single cryotherapy treatment with previous wart - resolved for several years.       Today's PHQ-2 Score:   PHQ-2 ( 1999 [employer]) 6/26/2019   Q1: Little interest or pleasure in doing things 0   Q2: Feeling down, depressed or hopeless 0   PHQ-2 Score 0   Q1: Little interest or pleasure in doing things Not at all   Q2: Feeling down, depressed or hopeless Not at all   PHQ-2 Score 0       Abuse: Current or Past(Physical, Sexual or Emotional)- No  Do you feel safe in your environment? Yes    Social History     Tobacco Use     Smoking status: Former Smoker     Smokeless tobacco: Never Used   Substance Use Topics     Alcohol use: Yes     Alcohol/week: 1.2 oz     Types: 2 Standard drinks or equivalent per week     Comment: 1 DRINK DAILY     If you drink alcohol do you typically have >3 drinks per day or >7 drinks per week? No    Alcohol Use 6/26/2019   Prescreen: >3 drinks/day or >7 drinks/week? No   Prescreen: >3 drinks/day or >7 drinks/week? -       Reviewed orders with patient.  Reviewed health maintenance and updated orders accordingly - Yes  BP Readings from Last 3 Encounters:   06/26/19 110/60   02/21/19 114/70   03/02/18 128/74    Wt Readings from Last 3 Encounters:    19 59.4 kg (131 lb)   19 61.2 kg (135 lb)   18 60.8 kg (134 lb)                  Patient Active Problem List   Diagnosis     Seasonal allergies     Psychosexual disorder     ADHD (attention deficit hyperactivity disorder)     Major depressive disorder, recurrent episode, moderate (H)     Alopecia     Other insomnia     Fibromyalgia     HSV (herpes simplex virus) infection     Right tennis elbow     Past Surgical History:   Procedure Laterality Date     CYSTOSCOPY N/A 2015    Procedure: CYSTOSCOPY;  Surgeon: Geronimo Yu MD;  Location:  OR      CYSTOSCOPY W DIL URETHRAL STRICTURE/CALIBRATION      young child and adult     HYSTERECTOMY, PAP NO LONGER INDICATED  2015    Due to fibroids, still has ovaries.     LAPAROSCOPIC HYSTERECTOMY TOTAL, SALPINGECTOMY BILATERAL Bilateral 2015    Procedure: LAPAROSCOPIC HYSTERECTOMY TOTAL, SALPINGECTOMY BILATERAL;  Surgeon: Karely Olmedo MD;  Location:  OR     ORTHOPEDIC SURGERY      CORTISONE INJECTION RIGHT SHOULDER       Social History     Tobacco Use     Smoking status: Former Smoker     Smokeless tobacco: Never Used   Substance Use Topics     Alcohol use: Yes     Alcohol/week: 1.2 oz     Types: 2 Standard drinks or equivalent per week     Comment: 1 DRINK DAILY     Family History   Adopted: Yes   Problem Relation Age of Onset     Obesity Mother      Asthma Mother      Arthritis Mother      Other - See Comments Mother         gallbladder     Cancer Father 68        Esophageal cancer,      Obesity Father      Parkinsonism Father 62     Other - See Comments Father         Blood clots/kidney stones     Other - See Comments Brother         kidney stones         Current Outpatient Medications   Medication Sig Dispense Refill     ACETAMINOPHEN PO Take 1,000 mg by mouth as needed        Amphetamine-Dextroamphetamine (ADDERALL PO) Take 20 mg by mouth daily        Calcium-Vitamin D-Vitamin K (CALCIUM SOFT CHEWS PO) Take  500 mg by mouth. Also has 500 IU of Vit D and 40 MCG of Vit K.        clonazePAM (KLONOPIN) 1 MG tablet Take 1 tablet (1 mg) by mouth daily Take one and a half at night before bed (Patient taking differently: Take 1 mg by mouth At Bedtime ) 135 tablet 3     ferrous sulfate (IRON) 325 (65 FE) MG tablet Take 1 tablet (325 mg) by mouth 2 times daily (Patient taking differently: Take 325 mg by mouth daily ) 60 tablet 2     ibuprofen (ADVIL,MOTRIN) 600 MG tablet Take 1 tablet (600 mg) by mouth every 6 hours as needed for moderate pain (Patient taking differently: Take 600 mg by mouth as needed for moderate pain ) 180 tablet 0     Omega-3 Fatty Acids (OMEGA-3 FISH OIL PO) Take 1 g by mouth daily        valACYclovir (VALTREX) 500 MG tablet TAKE 1 TABLET (500 MG) BY MOUTH 2 TIMES DAILY FOR 3 DAYS (Patient taking differently: Take 500 mg by mouth as needed ) 24 tablet 1     venlafaxine (EFFEXOR-XR) 75 MG 24 hr capsule Take 1 capsule (75 mg) by mouth daily 30 capsule 4     No Known Allergies    Mammogram Screening: Patient under age 50, mutual decision reflected in health maintenance.      Pertinent mammograms are reviewed under the imaging tab.  History of abnormal Pap smear: Status post benign hysterectomy. Health Maintenance and Surgical History updated.  PAP / HPV Latest Ref Rng & Units 11/13/2015 9/12/2012   PAP - NIL NIL   HPV 16 DNA NEG Negative -   HPV 18 DNA NEG Negative -   OTHER HR HPV NEG Negative -     Reviewed and updated as needed this visit by clinical staff  Tobacco  Allergies  Meds  Med Hx  Surg Hx  Fam Hx  Soc Hx        Reviewed and updated as needed this visit by Provider            Review of Systems   Constitutional: Negative.    HENT: Negative.    Eyes: Negative.    Respiratory: Negative.    Cardiovascular: Negative.    Gastrointestinal: Negative.    Genitourinary:        As in HPI   Musculoskeletal: Negative.    Skin:        As in HPI   Neurological: Negative.    Psychiatric/Behavioral: Negative.   "         OBJECTIVE:   Ht 1.613 m (5' 3.5\")   LMP 10/05/2015 (Exact Date)   BMI 23.54 kg/m    Physical Exam   Constitutional: She is oriented to person, place, and time. She appears well-developed and well-nourished. No distress.   HENT:   Right Ear: Tympanic membrane and external ear normal.   Left Ear: Tympanic membrane and external ear normal.   Nose: Nose normal.   Mouth/Throat: Oropharynx is clear and moist. No oropharyngeal exudate.   Eyes: Pupils are equal, round, and reactive to light. Conjunctivae are normal. Right eye exhibits no discharge. Left eye exhibits no discharge.   Neck: Neck supple. No tracheal deviation present. No thyromegaly present.   Cardiovascular: Normal rate, regular rhythm, S1 normal, S2 normal, normal heart sounds and normal pulses. Exam reveals no S3, no S4 and no friction rub.   No murmur heard.  Pulmonary/Chest: Effort normal and breath sounds normal. No respiratory distress. She has no wheezes. She has no rales. Right breast exhibits no mass, no nipple discharge and no tenderness. Left breast exhibits no mass, no nipple discharge and no tenderness.   Abdominal: Soft. She exhibits no mass. There is no hepatosplenomegaly. There is no tenderness.   Genitourinary: There is lesion (left labial majora, single painless 2 mm raised wart-like lesion) on the left labia. Cervix exhibits no motion tenderness and no discharge.   Musculoskeletal: Normal range of motion. She exhibits no edema.   Lymphadenopathy:     She has no cervical adenopathy.   Neurological: She is alert and oriented to person, place, and time. She has normal strength and normal reflexes. She exhibits normal muscle tone.   Skin: Skin is warm and dry. No rash noted.   Psychiatric: She has a normal mood and affect. Judgment and thought content normal. Cognition and memory are normal.         No results found for this or any previous visit (from the past 24 hour(s)).    PROCEDURE NOTE:  Lesion frozen with LN2 x3. Patient " "tolerated procedure well.       ASSESSMENT/PLAN:       ICD-10-CM    1. Encounter for routine adult health examination without abnormal findings Z00.00 Lipid panel reflex to direct LDL Fasting     Glucose   2. Genital warts A63.0 DESTRUCT BENIGN LESION, UP TO 14      COUNSELING:  Reviewed preventive health counseling, as reflected in patient instructions       Regular exercise       Healthy diet/nutrition    Estimated body mass index is 23.54 kg/m  as calculated from the following:    Height as of this encounter: 1.613 m (5' 3.5\").    Weight as of 2/21/19: 61.2 kg (135 lb).         reports that she has quit smoking. She has never used smokeless tobacco.      Counseling Resources:  ATP IV Guidelines  Pooled Cohorts Equation Calculator  Breast Cancer Risk Calculator  FRAX Risk Assessment  ICSI Preventive Guidelines  Dietary Guidelines for Americans, 2010  USDA's MyPlate  ASA Prophylaxis  Lung CA Screening    Michelle Alanis PA-C  [facility]  Answers for HPI/ROS submitted by the patient on 6/26/2019   Annual Exam:  If you checked off any problems, how difficult have these problems made it for you to do your work, take care of things at home, or get along with other people?: Not difficult at all  PHQ9 TOTAL SCORE: 4  JOHN 7 TOTAL SCORE: 0    "

## 2019-06-27 ASSESSMENT — ANXIETY QUESTIONNAIRES: GAD7 TOTAL SCORE: 0

## 2019-07-03 ENCOUNTER — ANCILLARY PROCEDURE (OUTPATIENT)
Dept: MAMMOGRAPHY | Facility: CLINIC | Age: 44
End: 2019-07-03
Payer: COMMERCIAL

## 2019-07-03 DIAGNOSIS — Z00.00 PREVENTATIVE HEALTH CARE: ICD-10-CM

## 2019-07-03 PROCEDURE — 77067 SCR MAMMO BI INCL CAD: CPT

## 2019-08-06 DIAGNOSIS — B00.9 HSV (HERPES SIMPLEX VIRUS) INFECTION: ICD-10-CM

## 2019-08-07 NOTE — TELEPHONE ENCOUNTER
Routing refill request to provider for review/approval because:  Taking differently than prescribed.  listed on medication. Provider approval needed.

## 2019-08-07 NOTE — TELEPHONE ENCOUNTER
"Requested Prescriptions   Pending Prescriptions Disp Refills     valACYclovir (VALTREX) 500 MG tablet [Pharmacy Med Name: VALACYCLOVIR  MG TABLET]  Last Written Prescription Date:  6/24/2019 END: 6/27/2019  Last Fill Quantity: 24 tablet,  # refills: 1   Last office visit: 6/26/2019 with prescribing provider:  Zeke   Future Office Visit:     24 tablet 1     Sig: TAKE 1 TABLET (500 MG) BY MOUTH 2 TIMES DAILY FOR 3 DAYS       Antivirals for Herpes Protocol Failed - 8/6/2019  8:08 PM        Failed - Normal serum creatinine on file in past 12 months     Recent Labs   Lab Test 01/27/15  1158   CR 0.57             Passed - Patient is age 12 or older        Passed - Recent (12 mo) or future (30 days) visit within the authorizing provider's specialty     Patient had office visit in the last 12 months or has a visit in the next 30 days with authorizing provider or within the authorizing provider's specialty.  See \"Patient Info\" tab in inbasket, or \"Choose Columns\" in Meds & Orders section of the refill encounter.              Passed - Medication is active on med list           "

## 2019-08-08 RX ORDER — VALACYCLOVIR HYDROCHLORIDE 500 MG/1
500 TABLET, FILM COATED ORAL 2 TIMES DAILY
Qty: 24 TABLET | Refills: 1 | Status: SHIPPED | OUTPATIENT
Start: 2019-08-08 | End: 2019-11-08

## 2019-10-03 ENCOUNTER — HEALTH MAINTENANCE LETTER (OUTPATIENT)
Age: 44
End: 2019-10-03

## 2019-10-17 DIAGNOSIS — Z00.00 ENCOUNTER FOR ROUTINE ADULT HEALTH EXAMINATION WITHOUT ABNORMAL FINDINGS: ICD-10-CM

## 2019-10-17 LAB
CHOLEST SERPL-MCNC: 171 MG/DL
GLUCOSE SERPL-MCNC: 87 MG/DL (ref 70–99)
HDLC SERPL-MCNC: 52 MG/DL
LDLC SERPL CALC-MCNC: 101 MG/DL
NONHDLC SERPL-MCNC: 119 MG/DL
TRIGL SERPL-MCNC: 89 MG/DL

## 2019-10-17 PROCEDURE — 36415 COLL VENOUS BLD VENIPUNCTURE: CPT | Performed by: PHYSICIAN ASSISTANT

## 2019-10-17 PROCEDURE — 82947 ASSAY GLUCOSE BLOOD QUANT: CPT | Performed by: PHYSICIAN ASSISTANT

## 2019-10-17 PROCEDURE — 80061 LIPID PANEL: CPT | Performed by: PHYSICIAN ASSISTANT

## 2019-10-17 NOTE — RESULT ENCOUNTER NOTE
Tee Alegre,    I just wanted to let you know that your lab results have been reviewed and are attached.    - GEOVANNY Durant is currently out of the office.  - Your Cholesterol is normal.  - Your glucose (screening for diabetes) was normal.    Please let me know if you have any questions and have a great week!    Sincerely,    Vashti Chase PA-C    [facility]  [address]  [place] [address]  [phone] (p)

## 2019-11-07 ENCOUNTER — MYC MEDICAL ADVICE (OUTPATIENT)
Dept: FAMILY MEDICINE | Facility: CLINIC | Age: 44
End: 2019-11-07

## 2019-11-07 DIAGNOSIS — B00.9 HSV (HERPES SIMPLEX VIRUS) INFECTION: ICD-10-CM

## 2019-11-08 RX ORDER — VALACYCLOVIR HYDROCHLORIDE 500 MG/1
500 TABLET, FILM COATED ORAL 2 TIMES DAILY
Qty: 24 TABLET | Refills: 1 | Status: SHIPPED | OUTPATIENT
Start: 2019-11-08 | End: 2020-05-27

## 2019-11-08 NOTE — TELEPHONE ENCOUNTER
"Requested Prescriptions   Pending Prescriptions Disp Refills     valACYclovir (VALTREX) 500 MG tablet    Last Written Prescription Date:  08/08/2019 END 08/11/20129  Last Fill Quantity: 24 tablet,  # refills: 1   Last office visit: 6/26/2019 with prescribing provider:  Zeke   Future Office Visit:     24 tablet 1     Sig: Take 1 tablet (500 mg) by mouth 2 times daily       Antivirals for Herpes Protocol Failed - 11/8/2019  7:20 AM        Failed - Normal serum creatinine on file in past 12 months     Recent Labs   Lab Test 01/27/15  1158   CR 0.57             Passed - Patient is age 12 or older        Passed - Recent (12 mo) or future (30 days) visit within the authorizing provider's specialty     Patient has had an office visit with the authorizing provider or a provider within the authorizing providers department within the previous 12 mos or has a future within next 30 days. See \"Patient Info\" tab in inbasket, or \"Choose Columns\" in Meds & Orders section of the refill encounter.              Passed - Medication is active on med list           "

## 2019-12-27 ENCOUNTER — OFFICE VISIT (OUTPATIENT)
Dept: FAMILY MEDICINE | Facility: CLINIC | Age: 44
End: 2019-12-27
Payer: COMMERCIAL

## 2019-12-27 VITALS
HEART RATE: 80 BPM | RESPIRATION RATE: 12 BRPM | WEIGHT: 135 LBS | SYSTOLIC BLOOD PRESSURE: 110 MMHG | TEMPERATURE: 98 F | BODY MASS INDEX: 23.54 KG/M2 | DIASTOLIC BLOOD PRESSURE: 70 MMHG

## 2019-12-27 DIAGNOSIS — R30.0 DYSURIA: ICD-10-CM

## 2019-12-27 DIAGNOSIS — N89.8 VAGINAL ODOR: Primary | ICD-10-CM

## 2019-12-27 LAB
ALBUMIN UR-MCNC: NEGATIVE MG/DL
APPEARANCE UR: CLEAR
BILIRUB UR QL STRIP: NEGATIVE
COLOR UR AUTO: YELLOW
GLUCOSE UR STRIP-MCNC: NEGATIVE MG/DL
HGB UR QL STRIP: NEGATIVE
KETONES UR STRIP-MCNC: NEGATIVE MG/DL
LEUKOCYTE ESTERASE UR QL STRIP: NEGATIVE
NITRATE UR QL: NEGATIVE
PH UR STRIP: 7 PH (ref 5–7)
RBC #/AREA URNS AUTO: NORMAL /HPF
SOURCE: NORMAL
SP GR UR STRIP: 1.01 (ref 1–1.03)
SPECIMEN SOURCE: NORMAL
UROBILINOGEN UR STRIP-ACNC: 0.2 EU/DL (ref 0.2–1)
WBC #/AREA URNS AUTO: NORMAL /HPF
WET PREP SPEC: NORMAL

## 2019-12-27 PROCEDURE — 81001 URINALYSIS AUTO W/SCOPE: CPT | Performed by: FAMILY MEDICINE

## 2019-12-27 PROCEDURE — 99213 OFFICE O/P EST LOW 20 MIN: CPT | Performed by: FAMILY MEDICINE

## 2019-12-27 PROCEDURE — 87210 SMEAR WET MOUNT SALINE/INK: CPT | Performed by: FAMILY MEDICINE

## 2019-12-27 RX ORDER — CALCIUM CARBONATE/VITAMIN D3 500-10/5ML
1 LIQUID (ML) ORAL DAILY
COMMUNITY
End: 2021-08-12

## 2019-12-27 ASSESSMENT — PATIENT HEALTH QUESTIONNAIRE - PHQ9
10. IF YOU CHECKED OFF ANY PROBLEMS, HOW DIFFICULT HAVE THESE PROBLEMS MADE IT FOR YOU TO DO YOUR WORK, TAKE CARE OF THINGS AT HOME, OR GET ALONG WITH OTHER PEOPLE: SOMEWHAT DIFFICULT
SUM OF ALL RESPONSES TO PHQ QUESTIONS 1-9: 2
SUM OF ALL RESPONSES TO PHQ QUESTIONS 1-9: 2

## 2019-12-27 NOTE — PROGRESS NOTES
Subjective     Sis Aguirre is a 44 year old female who presents to clinic today for the following health issues:    HPI   Vaginal Symptoms      Duration: 1 week    Description  Odor--this is now gone for 2 d     Intensity:  mild    Accompanying signs and symptoms (fever/dysuria/abdominal or back pain): None    Only odor  - gone 2 d ago     Slight discharge and min heme     History  Sexually active: yes, single partner,for 13 d  contraception - not needed s/po GARFIELD   Possibility of pregnancy: No  Recent antibiotic use: no     Precipitating or alleviating factors: None    Therapies tried and outcome: none   Outcome:             Reviewed and updated as needed this visit by Provider         Review of Systems   ROS COMP: CONSTITUTIONAL: NEGATIVE for fever, chills, change in weight  INTEGUMENTARY/SKIN: NEGATIVE for worrisome rashes, moles or lesions  EYES: NEGATIVE for vision changes or irritation  ENT/MOUTH: NEGATIVE for ear, mouth and throat problems  RESP: NEGATIVE for significant cough or SOB  BREAST: NEGATIVE for masses, tenderness or discharge  CV: NEGATIVE for chest pain, palpitations or peripheral edema  GI: NEGATIVE for nausea, abdominal pain, heartburn, or change in bowel habits   female: vaginal discharge , dysuria   MUSCULOSKELETAL: NEGATIVE for significant arthralgias or myalgia  NEURO: NEGATIVE for weakness, dizziness or paresthesias  ENDOCRINE: NEGATIVE for temperature intolerance, skin/hair changes  HEME: NEGATIVE for bleeding problems  PSYCHIATRIC: NEGATIVE for changes in mood or affect      Objective    LMP 10/05/2015   There is no height or weight on file to calculate BMI.  Physical Exam   GENERAL: healthy, alert and no distress  EYES: Eyes grossly normal to inspection, PERRL and conjunctivae and sclerae normal  RESP: lungs clear to auscultation - no rales, rhonchi or wheezes  MS: no gross musculoskeletal defects noted, no edema  SKIN: no suspicious lesions or rashes  NEURO: Normal strength and  tone, mentation intact and speech normal  PSYCH: mentation appears normal, affect normal/bright    Diagnostic Test Results:  Labs reviewed in Epic        Assessment & Plan       ICD-10-CM    1. Vaginal odor-now resolved for 2 d  N89.8    2. Dysuria for a wk  R30.0           Patient Instructions   Return if reoccurs or worsens for redo of wet prep       Return in about 6 months (around 6/27/2020) for Physical Exam.    Ladonna Sarmiento MD  [facility]    Answers for HPI/ROS submitted by the patient on 12/27/2019   If you checked off any problems, how difficult have these problems made it for you to do your work, take care of things at home, or get along with other people?: Somewhat difficult  PHQ9 TOTAL SCORE: 2

## 2019-12-27 NOTE — LETTER
December 28, 2019      Sis Aguirre  [address]   [place] [address]        Dear ,    We are writing to inform you of your test results.    All negative     Let's hope this just goes away     Resulted Orders   Wet prep   Result Value Ref Range    Specimen Description Vagina     Wet Prep No Trichomonas seen     Wet Prep No clue cells seen     Wet Prep No yeast seen     Wet Prep Rare     Wet Prep WBC'S seen    UA with Microscopic reflex to Culture   Result Value Ref Range    Color Urine Yellow     Appearance Urine Clear     Glucose Urine Negative NEG^Negative mg/dL    Bilirubin Urine Negative NEG^Negative    Ketones Urine Negative NEG^Negative mg/dL    Specific Gravity Urine 1.010 1.003 - 1.035    pH Urine 7.0 5.0 - 7.0 pH    Protein Albumin Urine Negative NEG^Negative mg/dL    Urobilinogen Urine 0.2 0.2 - 1.0 EU/dL    Nitrite Urine Negative NEG^Negative    Blood Urine Negative NEG^Negative    Leukocyte Esterase Urine Negative NEG^Negative    Source Midstream Urine     WBC Urine 0 - 5 OTO5^0 - 5 /HPF    RBC Urine O - 2 OTO2^O - 2 /HPF       If you have any questions or concerns, please call the clinic at the number listed above.       Sincerely,        Ladonna Sarmiento MD

## 2019-12-28 ASSESSMENT — PATIENT HEALTH QUESTIONNAIRE - PHQ9: SUM OF ALL RESPONSES TO PHQ QUESTIONS 1-9: 2

## 2020-05-24 DIAGNOSIS — B00.9 HSV (HERPES SIMPLEX VIRUS) INFECTION: ICD-10-CM

## 2020-05-27 RX ORDER — VALACYCLOVIR HYDROCHLORIDE 500 MG/1
TABLET, FILM COATED ORAL
Qty: 24 TABLET | Refills: 1 | Status: SHIPPED | OUTPATIENT
Start: 2020-05-27 | End: 2022-04-04

## 2020-10-08 ENCOUNTER — VIRTUAL VISIT (OUTPATIENT)
Dept: FAMILY MEDICINE | Facility: CLINIC | Age: 45
End: 2020-10-08
Payer: COMMERCIAL

## 2020-10-08 DIAGNOSIS — N30.00 ACUTE CYSTITIS WITHOUT HEMATURIA: Primary | ICD-10-CM

## 2020-10-08 PROCEDURE — 99213 OFFICE O/P EST LOW 20 MIN: CPT | Mod: 95 | Performed by: PHYSICIAN ASSISTANT

## 2020-10-08 RX ORDER — SULFAMETHOXAZOLE/TRIMETHOPRIM 800-160 MG
1 TABLET ORAL 2 TIMES DAILY
Qty: 6 TABLET | Refills: 0 | Status: SHIPPED | OUTPATIENT
Start: 2020-10-08 | End: 2020-10-11

## 2020-10-08 ASSESSMENT — ENCOUNTER SYMPTOMS
MUSCULOSKELETAL NEGATIVE: 1
CONSTITUTIONAL NEGATIVE: 1
EYES NEGATIVE: 1
GASTROINTESTINAL NEGATIVE: 1
NEUROLOGICAL NEGATIVE: 1
PSYCHIATRIC NEGATIVE: 1
RESPIRATORY NEGATIVE: 1
CARDIOVASCULAR NEGATIVE: 1

## 2020-10-08 NOTE — PROGRESS NOTES
"Sis Aguirre is a 45 year old female who is being evaluated via a billable telephone visit.      The patient has been notified of following:     \"This telephone visit will be conducted via a call between you and your physician/provider. We have found that certain health care needs can be provided without the need for a physical exam.  This service lets us provide the care you need with a short phone conversation.  If a prescription is necessary we can send it directly to your pharmacy.  If lab work is needed we can place an order for that and you can then stop by our lab to have the test done at a later time.    Telephone visits are billed at different rates depending on your insurance coverage. During this emergency period, for some insurers they may be billed the same as an in-person visit.  Please reach out to your insurance provider with any questions.    If during the course of the call the physician/provider feels a telephone visit is not appropriate, you will not be charged for this service.\"    Patient has given verbal consent for Telephone visit?  Yes    What phone number would you like to be contacted at? [phone]    How would you like to obtain your AVS? Doc Chi     Sis Aguirre is a 45 year old female who presents via phone visit today for the following health issues:    HPI     Genitourinary - Female  Onset/Duration: sunday  Description:   Painful urination (Dysuria): YES           Frequency: YES  Blood in urine (Hematuria): no  Delay in urine (Hesitency): no  Intensity: moderate  Progression of Symptoms:  worsening  Accompanying Signs & Symptoms:  Fever/chills: no  Flank pain: YES  Nausea and vomiting: no  Vaginal symptoms: none  Abdominal/Pelvic Pain: yes-pressure, bloating  History:   History of frequent UTI s: no  History of kidney stones: no  Sexually Active: YES  Possibility of pregnancy: No  Precipitating or alleviating factors: None  Therapies tried and outcome:  azo-not " very helpful, did UTI test strip at home     History of frequent UTI as kid  Now about once a year  Increased frequency, started having pain  Negative nitrites and positive leukocyte esterase                Past Medical History:   Diagnosis Date     ADHD (attention deficit hyperactivity disorder)      Alopecia      Condyloma acuminata 2013    tx'd by dermatologisf     Counseling for marital and partner problems      DUB (dysfunctional uterine bleeding)      Fibromyalgia      Genital herpes 2012    HSV 2     Insomnia      Meniere's disease 2015    left ear,  dx'd by ENT     Psychosexual disorder      Seasonal allergies     h/o allergy shots     Sensorineural hearing loss of left ear        Past Surgical History:   Procedure Laterality Date     CYSTOSCOPY N/A 2015    Procedure: CYSTOSCOPY;  Surgeon: Geronimo Yu MD;  Location:  OR      CYSTOSCOPY W DIL URETHRAL STRICTURE/CALIBRATION      young child and adult     HYSTERECTOMY, PAP NO LONGER INDICATED  2015    Due to fibroids, still has ovaries.     LAPAROSCOPIC HYSTERECTOMY TOTAL, SALPINGECTOMY BILATERAL Bilateral 2015    Procedure: LAPAROSCOPIC HYSTERECTOMY TOTAL, SALPINGECTOMY BILATERAL;  Surgeon: Karely Olmedo MD;  Location:  OR     ORTHOPEDIC SURGERY      CORTISONE INJECTION RIGHT SHOULDER       Family History   Adopted: Yes   Problem Relation Age of Onset     Obesity Mother      Asthma Mother      Arthritis Mother      Other - See Comments Mother         gallbladder     Cancer Father 68        Esophageal cancer,      Obesity Father      Parkinsonism Father 62     Other - See Comments Father         Blood clots/kidney stones     Other - See Comments Brother         kidney stones       Social History     Tobacco Use     Smoking status: Former Smoker     Smokeless tobacco: Never Used   Substance Use Topics     Alcohol use: Yes     Alcohol/week: 2.0 standard drinks     Types: 2 Standard drinks or equivalent per  week     Comment: 1 DRINK DAILY        Current Outpatient Medications   Medication     ACETAMINOPHEN PO     Amphetamine-Dextroamphetamine (ADDERALL PO)     Calcium-Vitamin D-Vitamin K (CALCIUM SOFT CHEWS PO)     clonazePAM (KLONOPIN) 1 MG tablet     ferrous sulfate (IRON) 325 (65 FE) MG tablet     ibuprofen (ADVIL,MOTRIN) 600 MG tablet     magnesium oxide 400 MG CAPS     Omega-3 Fatty Acids (OMEGA-3 FISH OIL PO)     sulfamethoxazole-trimethoprim (BACTRIM DS) 800-160 MG tablet     valACYclovir (VALTREX) 500 MG tablet     venlafaxine (EFFEXOR-XR) 75 MG 24 hr capsule     No current facility-administered medications for this visit.         No Known Allergies       Review of Systems   Constitutional: Negative.    HENT: Negative.    Eyes: Negative.    Respiratory: Negative.    Cardiovascular: Negative.    Gastrointestinal: Negative.    Musculoskeletal: Negative.    Skin: Negative.    Neurological: Negative.    Psychiatric/Behavioral: Negative.          Objective    LMP 10/05/2015   Physical Exam  Pulmonary:      Comments: Negative for cough  Neurological:      Mental Status: She is alert.   Psychiatric:         Mood and Affect: Mood normal.         Behavior: Behavior normal.         Thought Content: Thought content normal.         Judgment: Judgment normal.         Diagnostic Test Results:  No results found for this or any previous visit (from the past 24 hour(s)).        Assessment & Plan   Problem List Items Addressed This Visit     None      Visit Diagnoses     Acute cystitis without hematuria    -  Primary    Relevant Medications    sulfamethoxazole-trimethoprim (BACTRIM DS) 800-160 MG tablet         Push fluids  If not improved in 4 days, call and we will recheck urine and try Macrobid      Telephone Visit Time - 12 Minutes      There are no Patient Instructions on file for this visit.    Return in about 4 days (around 10/12/2020) for a recheck if you are not improved.    Michelle Alanis PA-C  [facility]  [facility]

## 2020-10-13 DIAGNOSIS — N30.00 ACUTE CYSTITIS WITHOUT HEMATURIA: ICD-10-CM

## 2020-10-13 LAB
ALBUMIN UR-MCNC: NEGATIVE MG/DL
APPEARANCE UR: CLEAR
BILIRUB UR QL STRIP: NEGATIVE
COLOR UR AUTO: YELLOW
GLUCOSE UR STRIP-MCNC: NEGATIVE MG/DL
HGB UR QL STRIP: NEGATIVE
KETONES UR STRIP-MCNC: NEGATIVE MG/DL
LEUKOCYTE ESTERASE UR QL STRIP: ABNORMAL
NITRATE UR QL: NEGATIVE
NON-SQ EPI CELLS #/AREA URNS LPF: NORMAL /LPF
PH UR STRIP: 7 PH (ref 5–7)
RBC #/AREA URNS AUTO: NORMAL /HPF
SOURCE: ABNORMAL
SP GR UR STRIP: 1.01 (ref 1–1.03)
UROBILINOGEN UR STRIP-ACNC: 0.2 EU/DL (ref 0.2–1)
WBC #/AREA URNS AUTO: NORMAL /HPF

## 2020-10-13 PROCEDURE — 81001 URINALYSIS AUTO W/SCOPE: CPT | Performed by: PHYSICIAN ASSISTANT

## 2020-10-13 PROCEDURE — 87086 URINE CULTURE/COLONY COUNT: CPT | Performed by: PHYSICIAN ASSISTANT

## 2020-10-14 LAB
BACTERIA SPEC CULT: NO GROWTH
Lab: NORMAL
SPECIMEN SOURCE: NORMAL

## 2020-10-16 NOTE — RESULT ENCOUNTER NOTE
Sis    Your lab tests are complete and I have reviewed the results.     - there was no growth on the urine culture.    How are you feeling?  Is there improvement with the new antibiotic?    If you have any questions or concerns, please feel free to call or send a [employer] message.    Sincerely,  Jeancarlos Alanis PA-C

## 2020-11-06 ENCOUNTER — ANCILLARY PROCEDURE (OUTPATIENT)
Dept: MAMMOGRAPHY | Facility: CLINIC | Age: 45
End: 2020-11-06
Attending: PHYSICIAN ASSISTANT
Payer: COMMERCIAL

## 2020-11-06 DIAGNOSIS — Z12.31 VISIT FOR SCREENING MAMMOGRAM: ICD-10-CM

## 2020-11-06 PROCEDURE — 77067 SCR MAMMO BI INCL CAD: CPT | Performed by: RADIOLOGY

## 2020-11-07 ENCOUNTER — HEALTH MAINTENANCE LETTER (OUTPATIENT)
Age: 45
End: 2020-11-07

## 2020-11-10 ENCOUNTER — MYC MEDICAL ADVICE (OUTPATIENT)
Dept: FAMILY MEDICINE | Facility: CLINIC | Age: 45
End: 2020-11-10

## 2020-11-10 DIAGNOSIS — Z20.822 EXPOSURE TO 2019 NOVEL CORONAVIRUS: Primary | ICD-10-CM

## 2021-08-06 ENCOUNTER — MYC MEDICAL ADVICE (OUTPATIENT)
Dept: FAMILY MEDICINE | Facility: CLINIC | Age: 46
End: 2021-08-06

## 2021-08-11 ASSESSMENT — ANXIETY QUESTIONNAIRES
2. NOT BEING ABLE TO STOP OR CONTROL WORRYING: NOT AT ALL
GAD7 TOTAL SCORE: 0
1. FEELING NERVOUS, ANXIOUS, OR ON EDGE: NOT AT ALL
6. BECOMING EASILY ANNOYED OR IRRITABLE: NOT AT ALL
7. FEELING AFRAID AS IF SOMETHING AWFUL MIGHT HAPPEN: NOT AT ALL
7. FEELING AFRAID AS IF SOMETHING AWFUL MIGHT HAPPEN: NOT AT ALL
4. TROUBLE RELAXING: NOT AT ALL
GAD7 TOTAL SCORE: 0
8. IF YOU CHECKED OFF ANY PROBLEMS, HOW DIFFICULT HAVE THESE MADE IT FOR YOU TO DO YOUR WORK, TAKE CARE OF THINGS AT HOME, OR GET ALONG WITH OTHER PEOPLE?: NOT DIFFICULT AT ALL
5. BEING SO RESTLESS THAT IT IS HARD TO SIT STILL: NOT AT ALL
GAD7 TOTAL SCORE: 0
3. WORRYING TOO MUCH ABOUT DIFFERENT THINGS: NOT AT ALL

## 2021-08-11 ASSESSMENT — PATIENT HEALTH QUESTIONNAIRE - PHQ9
SUM OF ALL RESPONSES TO PHQ QUESTIONS 1-9: 0
SUM OF ALL RESPONSES TO PHQ QUESTIONS 1-9: 0
10. IF YOU CHECKED OFF ANY PROBLEMS, HOW DIFFICULT HAVE THESE PROBLEMS MADE IT FOR YOU TO DO YOUR WORK, TAKE CARE OF THINGS AT HOME, OR GET ALONG WITH OTHER PEOPLE: NOT DIFFICULT AT ALL

## 2021-08-12 ENCOUNTER — OFFICE VISIT (OUTPATIENT)
Dept: FAMILY MEDICINE | Facility: CLINIC | Age: 46
End: 2021-08-12
Payer: COMMERCIAL

## 2021-08-12 VITALS
BODY MASS INDEX: 24.45 KG/M2 | RESPIRATION RATE: 16 BRPM | OXYGEN SATURATION: 97 % | HEIGHT: 63 IN | TEMPERATURE: 97.7 F | SYSTOLIC BLOOD PRESSURE: 108 MMHG | WEIGHT: 138 LBS | HEART RATE: 98 BPM | DIASTOLIC BLOOD PRESSURE: 76 MMHG

## 2021-08-12 DIAGNOSIS — R05.3 PERSISTENT DRY COUGH: Primary | ICD-10-CM

## 2021-08-12 DIAGNOSIS — Z23 NEED FOR VACCINATION: ICD-10-CM

## 2021-08-12 DIAGNOSIS — Z23 HIGH PRIORITY FOR 2019-NCOV VACCINE: ICD-10-CM

## 2021-08-12 DIAGNOSIS — R21 RASH AND NONSPECIFIC SKIN ERUPTION: ICD-10-CM

## 2021-08-12 PROCEDURE — 0011A COVID-19,PF,MODERNA: CPT | Performed by: PHYSICIAN ASSISTANT

## 2021-08-12 PROCEDURE — 99214 OFFICE O/P EST MOD 30 MIN: CPT | Mod: 25 | Performed by: PHYSICIAN ASSISTANT

## 2021-08-12 PROCEDURE — 91301 COVID-19,PF,MODERNA: CPT | Performed by: PHYSICIAN ASSISTANT

## 2021-08-12 RX ORDER — MULTIVITAMIN WITH IRON
1 TABLET ORAL DAILY
COMMUNITY

## 2021-08-12 RX ORDER — FLUTICASONE PROPIONATE 0.05 %
CREAM (GRAM) TOPICAL 2 TIMES DAILY
Qty: 30 G | Refills: 1 | Status: SHIPPED | OUTPATIENT
Start: 2021-08-12

## 2021-08-12 ASSESSMENT — ANXIETY QUESTIONNAIRES: GAD7 TOTAL SCORE: 0

## 2021-08-12 ASSESSMENT — PATIENT HEALTH QUESTIONNAIRE - PHQ9: SUM OF ALL RESPONSES TO PHQ QUESTIONS 1-9: 0

## 2021-08-12 ASSESSMENT — MIFFLIN-ST. JEOR: SCORE: 1236.21

## 2021-08-12 NOTE — PATIENT INSTRUCTIONS
Start a medication called omeprazole for heart burn - lets see if this helps the cough.   Recheck in one month, and ask your fiance to monitor if your cough improves.

## 2021-08-12 NOTE — PROGRESS NOTES
"    Assessment & Plan   Problem List Items Addressed This Visit     None      Visit Diagnoses     Persistent dry cough    -  Primary    Relevant Medications    omeprazole (PRILOSEC) 20 MG DR capsule    Rash and nonspecific skin eruption        Relevant Medications    fluticasone (CUTIVATE) 0.05 % external cream    Other Relevant Orders    Adult Allergy/Asthma Referral    Need for vaccination        High priority for 2019-nCoV vaccine        Relevant Orders    COVID-19,PF,MODERNA (Completed)    COVID-19,PF,MODERNA           - trial of PPI for dry cough - sx most consistent with GERD    - steroid cream to use as needed for dermatitis.  Allergy referral placed.     - COVID vaccine              Return in about 4 weeks (around 9/9/2021) for Medication Recheck.    MARTA [facility] MILLICENT Alegre is a 46 year old who presents for the following health issues     HPI      Acute Illness  Acute illness concerns: URI/Rash   Onset/Duration: x ongoing months  Symptoms:  Fever: no  Chills/Sweats: no  Headache (location?): no  Sinus Pressure: no  Conjunctivitis:  no  Ear Pain: no  Rhinorrhea: no  Congestion: no  Sore Throat: no  Cough: YES-non-productive and rash and itching  On arms, legs buttocks and back   Wheeze: no  Decreased Appetite: no  Nausea: no  Vomiting: no  Diarrhea: no  Dysuria/Freq.: no  Dysuria or Hematuria: no  Fatigue/Achiness: no  Sick/Strep Exposure: no  Therapies tried and outcome: None      Cough - rarely with sputum, mostly dry   Worse at night, she has tried propping herself up at night  Nausea  Possible heartburn  Smoking hx - 10 years total, 3 cigarettes per day      Rash - itching, comes and goes, moisturizers help, requesting referral to allergy         Review of Systems         Objective    /76   Pulse 98   Temp 97.7  F (36.5  C) (Tympanic)   Resp 16   Ht 1.61 m (5' 3.39\")   Wt 62.6 kg (138 lb)   LMP 10/05/2015   SpO2 97%   BMI " 24.15 kg/m    Body mass index is 24.15 kg/m .  Physical Exam  Constitutional:       General: She is not in acute distress.     Appearance: She is well-developed. She is not diaphoretic.   HENT:      Head: Normocephalic.      Right Ear: Tympanic membrane, ear canal and external ear normal.      Left Ear: Tympanic membrane, ear canal and external ear normal.      Nose: Nose normal.      Mouth/Throat:      Mouth: Mucous membranes are moist.      Pharynx: Oropharynx is clear. Uvula midline. No pharyngeal swelling or posterior oropharyngeal erythema.   Eyes:      Conjunctiva/sclera: Conjunctivae normal.   Pulmonary:      Effort: Pulmonary effort is normal.   Musculoskeletal:      Cervical back: Normal range of motion.   Neurological:      Mental Status: She is alert and oriented to person, place, and time.   Psychiatric:         Judgment: Judgment normal.            No results found for any visits on 08/12/21.            Answers for HPI/ROS submitted by the patient on 8/11/2021  If you checked off any problems, how difficult have these problems made it for you to do your work, take care of things at home, or get along with other people?: Not difficult at all  PHQ9 TOTAL SCORE: 0  JOHN 7 TOTAL SCORE: 0

## 2021-09-03 DIAGNOSIS — R05.3 PERSISTENT DRY COUGH: ICD-10-CM

## 2021-09-05 ENCOUNTER — HEALTH MAINTENANCE LETTER (OUTPATIENT)
Age: 46
End: 2021-09-05

## 2021-09-09 ENCOUNTER — ANCILLARY PROCEDURE (OUTPATIENT)
Dept: GENERAL RADIOLOGY | Facility: CLINIC | Age: 46
End: 2021-09-09
Attending: PHYSICIAN ASSISTANT
Payer: COMMERCIAL

## 2021-09-09 ENCOUNTER — OFFICE VISIT (OUTPATIENT)
Dept: FAMILY MEDICINE | Facility: CLINIC | Age: 46
End: 2021-09-09
Payer: COMMERCIAL

## 2021-09-09 VITALS
BODY MASS INDEX: 24.32 KG/M2 | DIASTOLIC BLOOD PRESSURE: 72 MMHG | RESPIRATION RATE: 16 BRPM | HEART RATE: 91 BPM | TEMPERATURE: 97.8 F | OXYGEN SATURATION: 100 % | SYSTOLIC BLOOD PRESSURE: 120 MMHG | WEIGHT: 139 LBS

## 2021-09-09 DIAGNOSIS — Z23 HIGH PRIORITY FOR 2019-NCOV VACCINE: ICD-10-CM

## 2021-09-09 DIAGNOSIS — R05.3 PERSISTENT DRY COUGH: Primary | ICD-10-CM

## 2021-09-09 DIAGNOSIS — R05.3 PERSISTENT DRY COUGH: ICD-10-CM

## 2021-09-09 DIAGNOSIS — F33.1 MAJOR DEPRESSIVE DISORDER, RECURRENT EPISODE, MODERATE (H): ICD-10-CM

## 2021-09-09 PROCEDURE — 96127 BRIEF EMOTIONAL/BEHAV ASSMT: CPT | Performed by: PHYSICIAN ASSISTANT

## 2021-09-09 PROCEDURE — 91301 COVID-19,PF,MODERNA: CPT | Performed by: PHYSICIAN ASSISTANT

## 2021-09-09 PROCEDURE — 0012A COVID-19,PF,MODERNA: CPT | Performed by: PHYSICIAN ASSISTANT

## 2021-09-09 PROCEDURE — 99213 OFFICE O/P EST LOW 20 MIN: CPT | Mod: 25 | Performed by: PHYSICIAN ASSISTANT

## 2021-09-09 PROCEDURE — 71046 X-RAY EXAM CHEST 2 VIEWS: CPT | Performed by: RADIOLOGY

## 2021-09-09 ASSESSMENT — PAIN SCALES - GENERAL: PAINLEVEL: NO PAIN (0)

## 2021-09-09 NOTE — PROGRESS NOTES
Assessment & Plan   Problem List Items Addressed This Visit        Behavioral    Major depressive disorder, recurrent episode, moderate (H)      Other Visit Diagnoses     Persistent dry cough    -  Primary    Relevant Orders    XR Chest 2 Views    High priority for 2019-nCoV vaccine             Cough:  CXR looks good and exam was normal today.   Reviewed UTD algorithm for chronic cough evaluation.  Patient is negative for GERD, asthma, COPD, current smoking, ACE inhibitor use.   Next step - see asthma/allergy specialist for chronic cough evaluation.  Discussed with patient today, she has an appointment with asthma/allergy in 3 weeks.     MDD: stable.  PHQ9 was 0 on 8/11/21    Patient Instructions   Please ask the allergy and asthma specialist to address the following:  Chronic rash  Chronic dry cough - already evaluated for GERD, COPD, medication reaction (ACE) and all workup has been negative.  Chest XR ordered at today's visit.       PHQ 6/26/2019 12/27/2019 8/11/2021   PHQ-9 Total Score 4 2 0   Q9: Thoughts of better off dead/self-harm past 2 weeks Not at all Not at all Not at all                      Return in about 3 weeks (around 9/30/2021) for Specialist Appointment - allergy and asthma .    Michelle Alanis PA-C  [facility] MILLICENT Alegre is a 46 year old who presents for the following health issues     HPI     Concern - follow up  Onset:   Description: pt is here to follow up from last visit.  Medications that she was prescribed are not helping her symptoms.  Still having dry cough  Intensity: moderate  Progression of Symptoms:  same  Accompanying Signs & Symptoms: none  Previous history of similar problem: none  Precipitating factors:        Worsened by: none  Alleviating factors:        Improved by: none  Therapies tried and outcome:  none       Persistent dry cough  No improvement with 30 day trial of PPI  No hx smoking, not taking an ACE        Review of  Systems         Objective    /72   Pulse 91   Temp 97.8  F (36.6  C)   Resp 16   Wt 63 kg (139 lb)   LMP 10/05/2015   SpO2 100%   BMI 24.32 kg/m    Body mass index is 24.32 kg/m .  Physical Exam  Constitutional:       General: She is not in acute distress.     Appearance: She is well-developed. She is not diaphoretic.   HENT:      Head: Normocephalic.      Right Ear: External ear normal.      Left Ear: External ear normal.      Nose: Nose normal.   Eyes:      Conjunctiva/sclera: Conjunctivae normal.   Cardiovascular:      Rate and Rhythm: Normal rate and regular rhythm.      Heart sounds: Normal heart sounds.   Pulmonary:      Effort: Pulmonary effort is normal.      Breath sounds: Normal breath sounds.   Musculoskeletal:      Cervical back: Normal range of motion.   Skin:     General: Skin is warm and dry.   Neurological:      Mental Status: She is alert and oriented to person, place, and time.   Psychiatric:         Judgment: Judgment normal.            CXR - Reviewed and interpreted by me Normal- no infiltrates, effusions, pneumothoraces, cardiomegaly or masses

## 2021-09-09 NOTE — PATIENT INSTRUCTIONS
Please ask the allergy and asthma specialist to address the following:  Chronic rash  Chronic dry cough - already evaluated for GERD, COPD, medication reaction (ACE) and all workup has been negative.  Chest XR ordered at today's visit.

## 2021-09-13 NOTE — RESULT ENCOUNTER NOTE
Sis    Your chest x-ray was normal and does not explain the chronic cough.   Lets see what the allergy specialist says!    If you have any questions or concerns, please feel free to call or send a [employer] message.    Sincerely,  Jeancarlos Alanis PA-C

## 2021-11-16 ENCOUNTER — ANCILLARY PROCEDURE (OUTPATIENT)
Dept: MAMMOGRAPHY | Facility: CLINIC | Age: 46
End: 2021-11-16
Attending: PHYSICIAN ASSISTANT
Payer: COMMERCIAL

## 2021-11-16 DIAGNOSIS — Z12.31 VISIT FOR SCREENING MAMMOGRAM: ICD-10-CM

## 2021-11-16 PROCEDURE — 77067 SCR MAMMO BI INCL CAD: CPT | Mod: TC | Performed by: RADIOLOGY

## 2021-11-16 PROCEDURE — 77063 BREAST TOMOSYNTHESIS BI: CPT | Mod: TC | Performed by: RADIOLOGY

## 2021-11-18 ENCOUNTER — OFFICE VISIT (OUTPATIENT)
Dept: FAMILY MEDICINE | Facility: CLINIC | Age: 46
End: 2021-11-18
Payer: COMMERCIAL

## 2021-11-18 VITALS
TEMPERATURE: 97.1 F | SYSTOLIC BLOOD PRESSURE: 108 MMHG | BODY MASS INDEX: 24.88 KG/M2 | RESPIRATION RATE: 20 BRPM | OXYGEN SATURATION: 100 % | DIASTOLIC BLOOD PRESSURE: 75 MMHG | HEART RATE: 77 BPM | WEIGHT: 142.2 LBS

## 2021-11-18 DIAGNOSIS — M10.9 GOUT OF BIG TOE: Primary | ICD-10-CM

## 2021-11-18 PROCEDURE — 99213 OFFICE O/P EST LOW 20 MIN: CPT | Performed by: PHYSICIAN ASSISTANT

## 2021-11-18 RX ORDER — TRIAMCINOLONE ACETONIDE 55 UG/1
SPRAY, METERED NASAL
COMMUNITY
Start: 2021-10-14 | End: 2022-09-15

## 2021-11-18 NOTE — PROGRESS NOTES
Assessment and Plan:     (M10.9) Gout of big toe  (primary encounter diagnosis)  Comment: symptoms x one month, getting better, no e/o infection, mom has gout  Plan: does not want preventive treatment right now, would like to stick to dietary changes for now which have been working, will make appt if recurrence, discussed s/sx infection and need to be seen promptly if develops      Jana Zhao PA-C        Arti Alegre is a 46 year old who presents for the following health issues     History of Present Illness       She eats 0-1 servings of fruits and vegetables daily.She consumes 2 sweetened beverage(s) daily.She exercises with enough effort to increase her heart rate 9 or less minutes per day.  She exercises with enough effort to increase her heart rate 3 or less days per week.   She is taking medications regularly.     Chief Complaint   Patient presents with     Foot Problems     possible gout, right foot     Pain dorsal aspect of right big toe MTP joint  Started about a month ago  No injury, no fever/chills, no redness/swelling  Much better now, has changed diet, cut out daily can of beer  Mom has gout    Review of Systems   See above      Objective    /75 (BP Location: Right arm, Patient Position: Sitting, Cuff Size: Adult Regular)   Pulse 77   Temp 97.1  F (36.2  C) (Temporal)   Resp 20   Wt 64.5 kg (142 lb 3.2 oz)   LMP 10/05/2015   SpO2 100%   BMI 24.88 kg/m    Body mass index is 24.88 kg/m .     Physical Exam   GENERAL: healthy, alert and no distress  MS: right MTP joint w/tiny bit of swelling, non-tender, no pain w/rom, no erythema/skin lesions, left MTP normal, DP and PT pulses are normal

## 2021-12-26 ENCOUNTER — HEALTH MAINTENANCE LETTER (OUTPATIENT)
Age: 46
End: 2021-12-26

## 2022-04-04 ENCOUNTER — OFFICE VISIT (OUTPATIENT)
Dept: FAMILY MEDICINE | Facility: CLINIC | Age: 47
End: 2022-04-04
Payer: COMMERCIAL

## 2022-04-04 VITALS
RESPIRATION RATE: 16 BRPM | BODY MASS INDEX: 24.07 KG/M2 | TEMPERATURE: 98.8 F | DIASTOLIC BLOOD PRESSURE: 70 MMHG | WEIGHT: 141 LBS | HEIGHT: 64 IN | HEART RATE: 92 BPM | OXYGEN SATURATION: 100 % | SYSTOLIC BLOOD PRESSURE: 110 MMHG

## 2022-04-04 DIAGNOSIS — F33.1 MAJOR DEPRESSIVE DISORDER, RECURRENT EPISODE, MODERATE (H): ICD-10-CM

## 2022-04-04 DIAGNOSIS — Z11.59 NEED FOR HEPATITIS C SCREENING TEST: ICD-10-CM

## 2022-04-04 DIAGNOSIS — Z12.11 SPECIAL SCREENING FOR MALIGNANT NEOPLASMS, COLON: ICD-10-CM

## 2022-04-04 DIAGNOSIS — E61.1 IRON DEFICIENCY: ICD-10-CM

## 2022-04-04 DIAGNOSIS — Z00.00 ENCOUNTER FOR ROUTINE ADULT HEALTH EXAMINATION WITHOUT ABNORMAL FINDINGS: Primary | ICD-10-CM

## 2022-04-04 DIAGNOSIS — B00.9 HSV (HERPES SIMPLEX VIRUS) INFECTION: ICD-10-CM

## 2022-04-04 DIAGNOSIS — Z00.00 ROUTINE GENERAL MEDICAL EXAMINATION AT A HEALTH CARE FACILITY: ICD-10-CM

## 2022-04-04 LAB
ANION GAP SERPL CALCULATED.3IONS-SCNC: 2 MMOL/L (ref 3–14)
BUN SERPL-MCNC: 9 MG/DL (ref 7–30)
CALCIUM SERPL-MCNC: 9.2 MG/DL (ref 8.5–10.1)
CHLORIDE BLD-SCNC: 105 MMOL/L (ref 94–109)
CHOLEST SERPL-MCNC: 194 MG/DL
CO2 SERPL-SCNC: 29 MMOL/L (ref 20–32)
CREAT SERPL-MCNC: 0.62 MG/DL (ref 0.52–1.04)
ERYTHROCYTE [DISTWIDTH] IN BLOOD BY AUTOMATED COUNT: 12.5 % (ref 10–15)
FASTING STATUS PATIENT QL REPORTED: YES
FERRITIN SERPL-MCNC: 126 NG/ML (ref 8–252)
GFR SERPL CREATININE-BSD FRML MDRD: >90 ML/MIN/1.73M2
GLUCOSE BLD-MCNC: 99 MG/DL (ref 70–99)
HCT VFR BLD AUTO: 43.6 % (ref 35–47)
HDLC SERPL-MCNC: 54 MG/DL
HGB BLD-MCNC: 14.4 G/DL (ref 11.7–15.7)
LDLC SERPL CALC-MCNC: 119 MG/DL
MCH RBC QN AUTO: 29.6 PG (ref 26.5–33)
MCHC RBC AUTO-ENTMCNC: 33 G/DL (ref 31.5–36.5)
MCV RBC AUTO: 90 FL (ref 78–100)
NONHDLC SERPL-MCNC: 140 MG/DL
PLATELET # BLD AUTO: 218 10E3/UL (ref 150–450)
POTASSIUM BLD-SCNC: 3.7 MMOL/L (ref 3.4–5.3)
RBC # BLD AUTO: 4.87 10E6/UL (ref 3.8–5.2)
SODIUM SERPL-SCNC: 136 MMOL/L (ref 133–144)
TRIGL SERPL-MCNC: 103 MG/DL
WBC # BLD AUTO: 6.6 10E3/UL (ref 4–11)

## 2022-04-04 PROCEDURE — 99396 PREV VISIT EST AGE 40-64: CPT | Performed by: PHYSICIAN ASSISTANT

## 2022-04-04 PROCEDURE — 82728 ASSAY OF FERRITIN: CPT | Performed by: PHYSICIAN ASSISTANT

## 2022-04-04 PROCEDURE — 36415 COLL VENOUS BLD VENIPUNCTURE: CPT | Performed by: PHYSICIAN ASSISTANT

## 2022-04-04 PROCEDURE — 80061 LIPID PANEL: CPT | Performed by: PHYSICIAN ASSISTANT

## 2022-04-04 PROCEDURE — 85027 COMPLETE CBC AUTOMATED: CPT | Performed by: PHYSICIAN ASSISTANT

## 2022-04-04 PROCEDURE — 80048 BASIC METABOLIC PNL TOTAL CA: CPT | Performed by: PHYSICIAN ASSISTANT

## 2022-04-04 PROCEDURE — 99213 OFFICE O/P EST LOW 20 MIN: CPT | Mod: 25 | Performed by: PHYSICIAN ASSISTANT

## 2022-04-04 RX ORDER — CETIRIZINE HYDROCHLORIDE 10 MG/1
10 TABLET ORAL DAILY
COMMUNITY

## 2022-04-04 RX ORDER — VALACYCLOVIR HYDROCHLORIDE 500 MG/1
500 TABLET, FILM COATED ORAL 2 TIMES DAILY
Qty: 24 TABLET | Refills: 1 | Status: SHIPPED | OUTPATIENT
Start: 2022-04-04 | End: 2022-12-09

## 2022-04-04 ASSESSMENT — ENCOUNTER SYMPTOMS
DYSURIA: 0
NAUSEA: 0
ARTHRALGIAS: 0
DIZZINESS: 0
HEADACHES: 0
HEARTBURN: 0
FREQUENCY: 0
CHILLS: 0
FEVER: 0
JOINT SWELLING: 0
WEAKNESS: 0
NERVOUS/ANXIOUS: 0
COUGH: 0
PARESTHESIAS: 0
PALPITATIONS: 0
HEMATURIA: 0
SORE THROAT: 0
EYE PAIN: 0
DIARRHEA: 0
MYALGIAS: 0
CONSTIPATION: 0
SHORTNESS OF BREATH: 0
ABDOMINAL PAIN: 0
HEMATOCHEZIA: 0
BREAST MASS: 0

## 2022-04-07 ENCOUNTER — MYC MEDICAL ADVICE (OUTPATIENT)
Dept: FAMILY MEDICINE | Facility: CLINIC | Age: 47
End: 2022-04-07
Payer: COMMERCIAL

## 2022-04-07 DIAGNOSIS — Z12.11 SPECIAL SCREENING FOR MALIGNANT NEOPLASMS, COLON: Primary | ICD-10-CM

## 2022-04-07 NOTE — RESULT ENCOUNTER NOTE
"Sis    Your lab tests are complete and I have reviewed the results.     - Your lab results look great  - My only (small) concern is the LDL.  This is \"bad\" cholesterol.  Here are some recommendations to lower the LDL:    Lifestyle changes to lower cholesterol:  1. Diet: Eating a Mediterranean diet can help lower cholesterol.   Check out this link from [facility]:  - [email or web address]  2. Exercise:  I recommend increasing exercise by one time a week to start.    The end goal for exercise is 150 minutes of exercise each week!    Diet and exercise are important!  - Improving your diet and increasing exercise has been shown to improve your overall health.  It is THE BEST preventive health measure.  It is also beneficial to mental health.   - A  or dietician are both great resources if that is available to you.           If you have any questions or concerns, please feel free to call or send a [employer] message.    Sincerely,  Jeancarlos Alanis PA-C  "

## 2022-05-31 ENCOUNTER — MYC MEDICAL ADVICE (OUTPATIENT)
Dept: FAMILY MEDICINE | Facility: CLINIC | Age: 47
End: 2022-05-31
Payer: COMMERCIAL

## 2022-06-15 ENCOUNTER — LAB (OUTPATIENT)
Dept: LAB | Facility: CLINIC | Age: 47
End: 2022-06-15
Payer: COMMERCIAL

## 2022-06-15 DIAGNOSIS — Z12.11 SPECIAL SCREENING FOR MALIGNANT NEOPLASMS, COLON: ICD-10-CM

## 2022-06-20 PROCEDURE — 82274 ASSAY TEST FOR BLOOD FECAL: CPT

## 2022-06-24 LAB — HEMOCCULT STL QL IA: NEGATIVE

## 2022-06-24 NOTE — RESULT ENCOUNTER NOTE
Sis    Your lab tests are complete and I have reviewed the results.     -FIT test (screening test for colon cancer) was normal.  There was no blood in your sample.  Recheck due in 1 year.    If you have any questions or concerns, please feel free to call or send a [employer] message.    Sincerely,  Jeancarlos Alanis PA-C

## 2022-09-15 ENCOUNTER — VIRTUAL VISIT (OUTPATIENT)
Dept: FAMILY MEDICINE | Facility: CLINIC | Age: 47
End: 2022-09-15
Payer: COMMERCIAL

## 2022-09-15 DIAGNOSIS — F33.1 MODERATE EPISODE OF RECURRENT MAJOR DEPRESSIVE DISORDER (H): ICD-10-CM

## 2022-09-15 DIAGNOSIS — F33.1 MAJOR DEPRESSIVE DISORDER, RECURRENT EPISODE, MODERATE (H): ICD-10-CM

## 2022-09-15 PROCEDURE — 99213 OFFICE O/P EST LOW 20 MIN: CPT | Mod: 95 | Performed by: PHYSICIAN ASSISTANT

## 2022-09-15 RX ORDER — VENLAFAXINE HYDROCHLORIDE 150 MG/1
150 CAPSULE, EXTENDED RELEASE ORAL DAILY
Qty: 60 CAPSULE | Refills: 0 | Status: SHIPPED | OUTPATIENT
Start: 2022-09-15 | End: 2022-10-14

## 2022-09-15 ASSESSMENT — PATIENT HEALTH QUESTIONNAIRE - PHQ9
SUM OF ALL RESPONSES TO PHQ QUESTIONS 1-9: 5
SUM OF ALL RESPONSES TO PHQ QUESTIONS 1-9: 5
10. IF YOU CHECKED OFF ANY PROBLEMS, HOW DIFFICULT HAVE THESE PROBLEMS MADE IT FOR YOU TO DO YOUR WORK, TAKE CARE OF THINGS AT HOME, OR GET ALONG WITH OTHER PEOPLE: SOMEWHAT DIFFICULT

## 2022-09-15 NOTE — PROGRESS NOTES
Sis is a 47 year old who is being evaluated via a billable video visit.      How would you like to obtain your AVS? MyChart  If the video visit is dropped, the invitation should be resent by: Text to cell phone: [phone]  Will anyone else be joining your video visit? No          Assessment & Plan   Problem List Items Addressed This Visit        Behavioral    Major depressive disorder, recurrent episode, moderate (H)    Relevant Medications    venlafaxine (EFFEXOR XR) 150 MG 24 hr capsule      Other Visit Diagnoses     Moderate episode of recurrent major depressive disorder (H)        Relevant Medications    venlafaxine (EFFEXOR XR) 150 MG 24 hr capsule         Worsening anxiety depression - symptoms present as anger and frustration.   Increase Venlafaxine to 150 mg daily and plan to follow up with psychiatry provider.   She will also reach out to her employer for therapy                 Return in about 2 weeks (around 9/29/2022) for Specialist Appointment.    Michelle Alanis PA-C  [facility]   Sis is a 47 year old, presenting for the following health issues:  MOOD CHANGES      History of Present Illness       Reason for visit:  Mood swings  Symptom onset:  1-3 days ago  Symptoms include:  Anger , sadness, feeling overwhelmed  Symptom intensity:  Moderate  Symptom progression:  Worsening  Had these symptoms before:  Yes  Has tried/received treatment for these symptoms:  Yes  Previous treatment was successful:  Yes  Prior treatment description:  Talk therapy  What makes it worse:  Lack of sleep  What makes it better:  Laughter, crying, talking with others-the anger thing seems to be new though and I am not sure how to handle this    She eats 0-1 servings of fruits and vegetables daily.She consumes 2 sweetened beverage(s) daily.She exercises with enough effort to increase her heart rate 9 or less minutes per day.  She exercises with enough effort to increase  her heart rate 3 or less days per week.   She is taking medications regularly.    Today's PHQ-9         PHQ-9 Total Score: 5    PHQ-9 Q9 Thoughts of better off dead/self-harm past 2 weeks :   Not at all    How difficult have these problems made it for you to do your work, take care of things at home, or get along with other people: Somewhat difficult     PHQ 12/27/2019 8/11/2021 9/15/2022   PHQ-9 Total Score 2 0 5   Q9: Thoughts of better off dead/self-harm past 2 weeks Not at all Not at all Not at all     JOHN-7 SCORE 11/13/2015 6/26/2019 8/11/2021   Total Score - 0 (minimal anxiety) 0 (minimal anxiety)   Total Score 1 0 0       The past few days she has had mood swings and anger  She feels anger come over her, she is slamming doors, shaking things, very frustrated  No safety concerns.   She also has sadness and feels like she might cry          Review of Systems         Objective           Vitals:  No vitals were obtained today due to virtual visit.    Physical Exam   GENERAL: Healthy, alert and no distress  EYES: Eyes grossly normal to inspection.  No discharge or erythema, or obvious scleral/conjunctival abnormalities.  RESP: No audible wheeze, cough, or visible cyanosis.  No visible retractions or increased work of breathing.    SKIN: Visible skin clear. No significant rash, abnormal pigmentation or lesions.  NEURO: Cranial nerves grossly intact.  Mentation and speech appropriate for age.  PSYCH: Mentation appears normal, affect normal/bright, judgement and insight intact, normal speech and appearance well-groomed.                Video-Visit Details    Video Start Time: 1:29 PM    Type of service:  Video Visit    Video End Time:1:40    Originating Location (pt. Location): Home    Distant Location (provider location):  [facility]     Platform used for Video Visit: Kextil

## 2022-09-19 ENCOUNTER — MYC MEDICAL ADVICE (OUTPATIENT)
Dept: FAMILY MEDICINE | Facility: CLINIC | Age: 47
End: 2022-09-19

## 2022-10-12 DIAGNOSIS — F33.1 MODERATE EPISODE OF RECURRENT MAJOR DEPRESSIVE DISORDER (H): ICD-10-CM

## 2022-10-14 RX ORDER — VENLAFAXINE HYDROCHLORIDE 150 MG/1
CAPSULE, EXTENDED RELEASE ORAL
Qty: 30 CAPSULE | Refills: 1 | Status: SHIPPED | OUTPATIENT
Start: 2022-10-14 | End: 2022-11-28

## 2022-10-14 NOTE — TELEPHONE ENCOUNTER
Routing refill request to provider for review/approval because:  Patient's PHQ9 score is not within protocol range.    PHQ 12/27/2019 8/11/2021 9/15/2022   PHQ-9 Total Score 2 0 5   Q9: Thoughts of better off dead/self-harm past 2 weeks Not at all Not at all Not at all     Nisha Luciano RN

## 2022-10-23 ENCOUNTER — HEALTH MAINTENANCE LETTER (OUTPATIENT)
Age: 47
End: 2022-10-23

## 2022-11-27 ENCOUNTER — MYC MEDICAL ADVICE (OUTPATIENT)
Dept: FAMILY MEDICINE | Facility: CLINIC | Age: 47
End: 2022-11-27

## 2022-11-27 DIAGNOSIS — F33.1 MODERATE EPISODE OF RECURRENT MAJOR DEPRESSIVE DISORDER (H): ICD-10-CM

## 2022-11-30 NOTE — TELEPHONE ENCOUNTER
Routing refill request to provider for review/approval because:  PHQ-9 score:    PHQ 9/15/2022   PHQ-9 Total Score 5   Q9: Thoughts of better off dead/self-harm past 2 weeks Not at all     Please see MC message from patient.     Flaco Siu, RN

## 2022-12-01 RX ORDER — VENLAFAXINE HYDROCHLORIDE 150 MG/1
150 CAPSULE, EXTENDED RELEASE ORAL DAILY
Qty: 90 CAPSULE | Refills: 0 | Status: SHIPPED | OUTPATIENT
Start: 2022-12-01

## 2022-12-08 ENCOUNTER — MYC MEDICAL ADVICE (OUTPATIENT)
Dept: FAMILY MEDICINE | Facility: CLINIC | Age: 47
End: 2022-12-08

## 2022-12-09 ENCOUNTER — E-VISIT (OUTPATIENT)
Dept: FAMILY MEDICINE | Facility: CLINIC | Age: 47
End: 2022-12-09
Payer: COMMERCIAL

## 2022-12-09 DIAGNOSIS — N30.90 BLADDER INFECTION: Primary | ICD-10-CM

## 2022-12-09 DIAGNOSIS — B00.9 HSV (HERPES SIMPLEX VIRUS) INFECTION: ICD-10-CM

## 2022-12-09 PROCEDURE — 99421 OL DIG E/M SVC 5-10 MIN: CPT | Performed by: PHYSICIAN ASSISTANT

## 2022-12-09 RX ORDER — VALACYCLOVIR HYDROCHLORIDE 500 MG/1
500 TABLET, FILM COATED ORAL 2 TIMES DAILY
Qty: 24 TABLET | Refills: 1 | Status: SHIPPED | OUTPATIENT
Start: 2022-12-09 | End: 2023-09-08

## 2022-12-09 RX ORDER — SULFAMETHOXAZOLE/TRIMETHOPRIM 800-160 MG
1 TABLET ORAL 2 TIMES DAILY
Qty: 6 TABLET | Refills: 0 | Status: SHIPPED | OUTPATIENT
Start: 2022-12-09 | End: 2022-12-12

## 2022-12-09 NOTE — PATIENT INSTRUCTIONS
Dear Sis Aguirre    After reviewing your responses, I've been able to diagnose you with a urinary tract infection, which is a common infection of the bladder with bacteria.  This is not a sexually transmitted infection, though urinating immediately after intercourse can help prevent infections.  Drinking lots of fluids is also helpful to clear your current infection and prevent the next one.      I have sent a prescription for antibiotics to your pharmacy to treat this infection.    It is important that you take all of your prescribed medication even if your symptoms are improving after a few doses.  Taking all of your medicine helps prevent the symptoms from returning.     If your symptoms worsen, you develop pain in your back or stomach, develop fevers, or are not improving in 5 days, please contact your primary care provider for an appointment or visit any of our convenient Walk-in or Urgent Care Centers to be seen, which can be found on our website here.    Thanks again for choosing us as your health care partner,    Jossy Darden PA-C

## 2022-12-21 ENCOUNTER — ANCILLARY PROCEDURE (OUTPATIENT)
Dept: MAMMOGRAPHY | Facility: CLINIC | Age: 47
End: 2022-12-21
Attending: PHYSICIAN ASSISTANT
Payer: COMMERCIAL

## 2022-12-21 DIAGNOSIS — Z12.31 VISIT FOR SCREENING MAMMOGRAM: ICD-10-CM

## 2022-12-21 PROCEDURE — 77063 BREAST TOMOSYNTHESIS BI: CPT | Mod: TC | Performed by: RADIOLOGY

## 2022-12-21 PROCEDURE — 77067 SCR MAMMO BI INCL CAD: CPT | Mod: TC | Performed by: RADIOLOGY

## 2022-12-28 ENCOUNTER — HOSPITAL ENCOUNTER (OUTPATIENT)
Dept: MAMMOGRAPHY | Facility: CLINIC | Age: 47
Discharge: HOME | End: 2022-12-28
Attending: PHYSICIAN ASSISTANT | Admitting: PHYSICIAN ASSISTANT
Payer: COMMERCIAL

## 2022-12-28 DIAGNOSIS — R92.8 ABNORMAL MAMMOGRAM: ICD-10-CM

## 2022-12-28 PROCEDURE — 76642 ULTRASOUND BREAST LIMITED: CPT | Mod: RT

## 2022-12-29 ENCOUNTER — MYC MEDICAL ADVICE (OUTPATIENT)
Dept: FAMILY MEDICINE | Facility: CLINIC | Age: 47
End: 2022-12-29

## 2022-12-29 ENCOUNTER — NURSE TRIAGE (OUTPATIENT)
Dept: FAMILY MEDICINE | Facility: CLINIC | Age: 47
End: 2022-12-29

## 2022-12-29 ENCOUNTER — OFFICE VISIT (OUTPATIENT)
Dept: FAMILY MEDICINE | Facility: CLINIC | Age: 47
End: 2022-12-29
Payer: COMMERCIAL

## 2022-12-29 VITALS
DIASTOLIC BLOOD PRESSURE: 64 MMHG | HEART RATE: 77 BPM | BODY MASS INDEX: 24.72 KG/M2 | WEIGHT: 144 LBS | OXYGEN SATURATION: 99 % | SYSTOLIC BLOOD PRESSURE: 111 MMHG | TEMPERATURE: 98.8 F

## 2022-12-29 DIAGNOSIS — R30.0 DYSURIA: Primary | ICD-10-CM

## 2022-12-29 LAB
BACTERIA #/AREA URNS HPF: ABNORMAL /HPF
RBC #/AREA URNS AUTO: ABNORMAL /HPF
SQUAMOUS #/AREA URNS AUTO: ABNORMAL /LPF
WBC #/AREA URNS AUTO: ABNORMAL /HPF

## 2022-12-29 PROCEDURE — 81015 MICROSCOPIC EXAM OF URINE: CPT

## 2022-12-29 PROCEDURE — 99213 OFFICE O/P EST LOW 20 MIN: CPT

## 2022-12-29 RX ORDER — CEPHALEXIN 500 MG/1
500 CAPSULE ORAL 2 TIMES DAILY
Qty: 14 CAPSULE | Refills: 0 | Status: SHIPPED | OUTPATIENT
Start: 2022-12-29 | End: 2023-01-05

## 2022-12-29 NOTE — PATIENT INSTRUCTIONS
Urine test will be done at the [facility] and we should have results back by 7 pm tonight.  I will contact you with those results.  If it looks like there is an infection present, you should start the cephalexin 500 mg twice daily.    Be sure to keep drinking plenty of water.

## 2022-12-29 NOTE — TELEPHONE ENCOUNTER
See triage telephone encounter.   Patient scheduled at [facility] for same day treatment.  Please advise if follow up with urology or office visit with PCO is recommended. Marizol Roy RN

## 2022-12-29 NOTE — PROGRESS NOTES
ICD-10-CM    1. Dysuria  R30.0 UA without Microscopic     UA reflex to Microscopic and Culture     UA reflex to Microscopic and Culture     cephALEXin (KEFLEX) 500 MG capsule     CANCELED: UA without Microscopic        Suspect last UTI treated with Bactrim had partial resistance but no testing was done at that time that can support this theory.  Since pt has taken AZO, we are unable to perform a dipstick test here at our site.  Will send out for urine micro and follow up with patient by phone.  Urine culture will be done if micro is positive.    PLAN:  Patient Instructions   Urine test will be done at the [facility] and we should have results back by 7 pm tonight.  I will contact you with those results.  If it looks like there is an infection present, you should start the cephalexin 500 mg twice daily.    Be sure to keep drinking plenty of water.      SUBJECTIVE:  Sis Aguirre is a 47 year old female who presents to UC today with a few days of worsening pain with urination as well as some lower abdominal discomfort.  No fever.  No vaginal discharge.  Pt is s/p hysterectomy.  No blood in urine.    Did an evisit for UTI symptoms on 12/9 and was treated empirically with Bactrim BID x 3 days.    OBJECTIVE:  /64 (BP Location: Right arm, Patient Position: Chair, Cuff Size: Adult Regular)   Pulse 77   Temp 98.8  F (37.1  C) (Tympanic)   Wt 65.3 kg (144 lb)   LMP 10/05/2015   SpO2 99%   BMI 24.72 kg/m    GEN: well-appearing, in NAD  Back: no CVA tenderness    Urine micro pending.

## 2022-12-29 NOTE — TELEPHONE ENCOUNTER
Nurse Triage SBAR    Is this a 2nd Level Triage? NO    Situation: Patient reports burning and frequency of urination. Also reports lower back pain. Denies fever or blood in urine    Background: E visit 12/9 for UTI. Symptoms resolved, but now have returned.     Assessment: UTI    Protocol Recommended Disposition:   See in Office Today, See More Appropriate Protocol   Scheduled at [facility].    Recommendation: not routed.      not routed.    Does the patient meet one of the following criteria for ADS visit consideration? 16+ years old, with an [facility] PCP     TIP  Providers, please consider if this condition is appropriate for management at one of our Acute and Diagnostic Services sites.     If patient is a good candidate, please use dotphrase <dot>triageresponse and select Refer to ADS to document.      Reason for Disposition    Pain or burning with passing urine (urination) and female    Painful urination AND EITHER frequency or urgency    Additional Information    Negative: Shock suspected (e.g., cold/pale/clammy skin, too weak to stand, low BP, rapid pulse)    Negative: Sounds like a life-threatening emergency to the triager    Negative: Followed a female genital area injury (e.g., vagina, vulva)    Negative: Followed a male genital area injury (penis, scrotum)    Negative: Vaginal discharge    Negative: Pus (white, yellow) or bloody discharge from end of penis    Negative: Pain or burning with passing urine (urination) and pregnant    Negative: Shock suspected (e.g., cold/pale/clammy skin, too weak to stand, low BP, rapid pulse)    Negative: Sounds like a life-threatening emergency to the triager    Negative: Unable to urinate (or only a few drops) and bladder feels very full    Negative: Vomiting    Negative: Patient sounds very sick or weak to the triager    Negative: SEVERE pain with urination    Negative: Fever > 100.4 F (38.0 C)    Negative: Side (flank) or lower back pain present    Negative: Taking antibiotic >  "24 hours for UTI and fever persists    Negative: Taking antibiotic > 3 days for UTI and painful urination not improved    Negative: Unusual vaginal discharge    Negative: > 2 UTIs in last year    Negative: Patient is worried they have a sexually transmitted infection (STI)    Negative: Age > 50 years    Negative: Possibility of pregnancy    Answer Assessment - Initial Assessment Questions  1. SYMPTOM: \"What's the main symptom you're concerned about?\" (e.g., frequency, incontinence)      Frequency, burning with abdominal pain and lower back pain.   2. ONSET: \"When did the  burning  start?\"      Tuesday evening 12/27.  3. PAIN: \"Is there any pain?\" If Yes, ask: \"How bad is it?\" (Scale: 1-10; mild, moderate, severe)      Manageable. Taking Azo. 5/10. Moderate pain. Not waking up from the pain. Waking up to go to the bathroom.   4. CAUSE: \"What do you think is causing the symptoms?\"      Maybe from holding urine too long.   5. OTHER SYMPTOMS: \"Do you have any other symptoms?\" (e.g., fever, flank pain, blood in urine, pain with urination)      Center of lower back. Burning with urination  6. PREGNANCY: \"Is there any chance you are pregnant?\" \"When was your last menstrual period?\"      Not pregnant. Hysterectomy in 2015.    Protocols used: URINARY SYMPTOMS-A-OH, URINATION PAIN - FEMALE-A-[place]    Marizol Roy RN      "

## 2022-12-30 NOTE — PROGRESS NOTES
Urine microscopic results reviewed.  Spoke with patient by phone to update her on the test results.    The micro results are not clearly pointing to a bladder infection, but given her recent UTI symptoms that improved with Bactrim and have now returned, I have asked her to start the cephalexin as prescribed and to return to a non-[facility] site for a lab only visit to do a wet prep if she is not improving after 2-3 days on cephalexin.    Future order for wet prep entered.

## 2023-04-20 ENCOUNTER — PATIENT OUTREACH (OUTPATIENT)
Dept: CARE COORDINATION | Facility: CLINIC | Age: 48
End: 2023-04-20
Payer: COMMERCIAL

## 2023-06-01 ENCOUNTER — HEALTH MAINTENANCE LETTER (OUTPATIENT)
Age: 48
End: 2023-06-01

## 2023-09-08 DIAGNOSIS — B00.9 HSV (HERPES SIMPLEX VIRUS) INFECTION: ICD-10-CM

## 2023-09-08 RX ORDER — VALACYCLOVIR HYDROCHLORIDE 500 MG/1
500 TABLET, FILM COATED ORAL 2 TIMES DAILY
Qty: 24 TABLET | Refills: 1 | Status: SHIPPED | OUTPATIENT
Start: 2023-09-08 | End: 2024-01-22

## 2024-01-22 DIAGNOSIS — B00.9 HSV (HERPES SIMPLEX VIRUS) INFECTION: ICD-10-CM

## 2024-01-22 RX ORDER — VALACYCLOVIR HYDROCHLORIDE 500 MG/1
500 TABLET, FILM COATED ORAL 2 TIMES DAILY
Qty: 24 TABLET | Refills: 0 | Status: SHIPPED | OUTPATIENT
Start: 2024-01-22

## 2024-02-22 ENCOUNTER — ANCILLARY PROCEDURE (OUTPATIENT)
Dept: MAMMOGRAPHY | Facility: CLINIC | Age: 49
End: 2024-02-22
Payer: COMMERCIAL

## 2024-02-22 DIAGNOSIS — Z12.31 VISIT FOR SCREENING MAMMOGRAM: ICD-10-CM

## 2024-02-22 PROCEDURE — 77067 SCR MAMMO BI INCL CAD: CPT | Mod: GC | Performed by: RADIOLOGY

## 2024-02-22 PROCEDURE — 77063 BREAST TOMOSYNTHESIS BI: CPT | Mod: GC | Performed by: RADIOLOGY

## 2024-06-08 ENCOUNTER — HEALTH MAINTENANCE LETTER (OUTPATIENT)
Age: 49
End: 2024-06-08

## 2024-10-13 NOTE — PROGRESS NOTES
SUBJECTIVE:   CC: Sis Aguirre is an 46 year old woman who presents for preventive health visit.       Patient has been advised of split billing requirements and indicates understanding: Yes  Healthy Habits:     Getting at least 3 servings of Calcium per day:  Yes    Bi-annual eye exam:  Yes    Dental care twice a year:  Yes    Sleep apnea or symptoms of sleep apnea:  Daytime drowsiness    Diet:  Low salt    Frequency of exercise:  1 day/week    Duration of exercise:  15-30 minutes    Taking medications regularly:  Yes    Barriers to taking medications:  None    Medication side effects:  Other    PHQ-2 Total Score: 0    Additional concerns today:  No              Today's PHQ-2 Score:   PHQ-2 ( 1999 [employer]) 4/4/2022   Q1: Little interest or pleasure in doing things 0   Q2: Feeling down, depressed or hopeless 0   PHQ-2 Score 0   PHQ-2 Total Score (12-17 Years)- Positive if 3 or more points; Administer PHQ-A if positive -   Q1: Little interest or pleasure in doing things Not at all   Q2: Feeling down, depressed or hopeless Not at all   PHQ-2 Score 0       Abuse: Current or Past (Physical, Sexual or Emotional) - No  Do you feel safe in your environment? Yes    Have you ever done Advance Care Planning? (For example, a Health Directive, POLST, or a discussion with a medical provider or your loved ones about your wishes): No, advance care planning information given to patient to review.  Patient declined advance care planning discussion at this time.    Social History     Tobacco Use     Smoking status: Former Smoker     Smokeless tobacco: Never Used   Substance Use Topics     Alcohol use: Yes     Alcohol/week: 2.0 standard drinks     Types: 2 Standard drinks or equivalent per week     Comment: 1 DRINK DAILY     If you drink alcohol do you typically have >3 drinks per day or >7 drinks per week? No    Alcohol Use 4/4/2022   Prescreen: >3 drinks/day or >7 drinks/week? No   Prescreen: >3 drinks/day or >7 drinks/week? -        Reviewed orders with patient.  Reviewed health maintenance and updated orders accordingly - Yes  Patient Active Problem List   Diagnosis     Seasonal allergies     Psychosexual disorder     ADHD (attention deficit hyperactivity disorder)     Major depressive disorder, recurrent episode, moderate (H)     Alopecia     Other insomnia     Fibromyalgia     HSV (herpes simplex virus) infection     Right tennis elbow     Past Surgical History:   Procedure Laterality Date     CYSTOSCOPY N/A 2015    Procedure: CYSTOSCOPY;  Surgeon: Geronimo Yu MD;  Location:  OR     HYSTERECTOMY, PAP NO LONGER INDICATED  2015    Due to fibroids, still has ovaries.     LAPAROSCOPIC HYSTERECTOMY TOTAL, SALPINGECTOMY BILATERAL Bilateral 2015    Procedure: LAPAROSCOPIC HYSTERECTOMY TOTAL, SALPINGECTOMY BILATERAL;  Surgeon: Karely Olmedo MD;  Location:  OR     ORTHOPEDIC SURGERY      CORTISONE INJECTION RIGHT SHOULDER     ZZHC CYSTOSCOPY W DIL URETHRAL STRICTURE/CALIBRATION      young child and adult       Social History     Tobacco Use     Smoking status: Former Smoker     Smokeless tobacco: Never Used   Substance Use Topics     Alcohol use: Yes     Alcohol/week: 2.0 standard drinks     Types: 2 Standard drinks or equivalent per week     Comment: 1 DRINK DAILY     Family History   Adopted: Yes   Problem Relation Age of Onset     Obesity Mother      Asthma Mother      Arthritis Mother      Other - See Comments Mother         gallbladder     Cancer Father 68        Esophageal cancer,      Obesity Father      Parkinsonism Father 62     Other - See Comments Father         Blood clots/kidney stones     Other - See Comments Brother         kidney stones         Current Outpatient Medications   Medication Sig Dispense Refill     ACETAMINOPHEN PO Take 1,000 mg by mouth as needed        Amphetamine-Dextroamphetamine (ADDERALL PO) Take 20 mg by mouth daily        Calcium-Vitamin D-Vitamin K (CALCIUM  SOFT CHEWS PO) Take 500 mg by mouth. Also has 500 IU of Vit D and 40 MCG of Vit K.        cetirizine (ZYRTEC) 10 MG tablet Take 10 mg by mouth daily       clonazePAM (KLONOPIN) 1 MG tablet Take 1 tablet (1 mg) by mouth daily Take one and a half at night before bed (Patient taking differently: Take 1 mg by mouth At Bedtime ) 135 tablet 3     ferrous sulfate (IRON) 325 (65 FE) MG tablet Take 1 tablet (325 mg) by mouth 2 times daily (Patient taking differently: Take 325 mg by mouth daily ) 60 tablet 2     fluticasone (CUTIVATE) 0.05 % external cream Apply topically 2 times daily 30 g 1     magnesium 250 MG tablet Take 1 tablet by mouth daily       Omega-3 Fatty Acids (OMEGA-3 FISH OIL PO) Take 1 g by mouth daily        sodium chloride (OCEAN) 0.65 % nasal spray        triamcinolone (NASACORT) 55 MCG/ACT nasal aerosol        valACYclovir (VALTREX) 500 MG tablet Take 1 tablet (500 mg) by mouth 2 times daily for 3 days For each outbreak. 24 tablet 1     venlafaxine (EFFEXOR-XR) 75 MG 24 hr capsule Take 1 capsule (75 mg) by mouth daily 30 capsule 4     No Known Allergies    Breast Cancer Screening:    FHS-7:   Breast CA Risk Assessment (FHS-7) 11/16/2021 11/16/2021 4/4/2022   Did any of your first-degree relatives have breast or ovarian cancer? No - No   Did any of your relatives have bilateral breast cancer? No - No   Did any man in your family have breast cancer? No - No   Did any woman in your family have breast and ovarian cancer? No - Yes   Did any woman in your family have breast cancer before age 50 y? No Yes Yes   Do you have 2 or more relatives with breast and/or ovarian cancer? No - No   Do you have 2 or more relatives with breast and/or bowel cancer? No - No       Mammogram Screening: Recommended annual mammography  Pertinent mammograms are reviewed under the imaging tab.    History of abnormal Pap smear: Status post benign hysterectomy. Health Maintenance and Surgical History updated.  PAP / HPV Latest Ref Rng  "& Units 11/13/2015 9/12/2012   PAP (Historical) - NIL NIL   HPV16 NEG Negative -   HPV18 NEG Negative -   HRHPV NEG Negative -     Reviewed and updated as needed this visit by clinical staff   Tobacco  Allergies  Meds   Med Hx  Surg Hx  Fam Hx  Soc Hx          Reviewed and updated as needed this visit by Provider                       Review of Systems   Constitutional: Negative for chills and fever.   HENT: Negative for congestion, ear pain, hearing loss and sore throat.    Eyes: Negative for pain and visual disturbance.   Respiratory: Negative for cough and shortness of breath.    Cardiovascular: Negative for chest pain, palpitations and peripheral edema.   Gastrointestinal: Negative for abdominal pain, constipation, diarrhea, heartburn, hematochezia and nausea.   Breasts:  Negative for tenderness, breast mass and discharge.   Genitourinary: Negative for dysuria, frequency, genital sores, hematuria, pelvic pain, urgency, vaginal bleeding and vaginal discharge.   Musculoskeletal: Negative for arthralgias, joint swelling and myalgias.   Skin: Negative for rash.   Neurological: Negative for dizziness, weakness, headaches and paresthesias.   Psychiatric/Behavioral: Negative for mood changes. The patient is not nervous/anxious.           OBJECTIVE:   /70   Pulse 92   Temp 98.8  F (37.1  C)   Resp 16   Ht 1.626 m (5' 4\")   Wt 64 kg (141 lb)   LMP 10/05/2015   SpO2 100%   BMI 24.20 kg/m    Physical Exam  Constitutional:       General: She is not in acute distress.     Appearance: She is well-developed.   HENT:      Right Ear: Tympanic membrane and external ear normal.      Left Ear: Tympanic membrane and external ear normal.   Eyes:      General:         Right eye: No discharge.         Left eye: No discharge.      Conjunctiva/sclera: Conjunctivae normal.      Pupils: Pupils are equal, round, and reactive to light.   Neck:      Thyroid: No thyromegaly.      Trachea: No tracheal deviation. "   Cardiovascular:      Rate and Rhythm: Normal rate and regular rhythm.      Pulses: Normal pulses.      Heart sounds: Normal heart sounds, S1 normal and S2 normal. No murmur heard.    No friction rub. No S3 or S4 sounds.   Pulmonary:      Effort: Pulmonary effort is normal. No respiratory distress.      Breath sounds: Normal breath sounds. No wheezing or rales.   Chest:   Breasts:      Right: No mass, nipple discharge or tenderness.      Left: No mass, nipple discharge or tenderness.       Abdominal:      Palpations: Abdomen is soft. There is no mass.      Tenderness: There is no abdominal tenderness.   Musculoskeletal:         General: Normal range of motion.      Cervical back: Neck supple.   Lymphadenopathy:      Cervical: No cervical adenopathy.   Skin:     General: Skin is warm and dry.      Findings: No rash.   Neurological:      Mental Status: She is alert and oriented to person, place, and time.      Motor: No abnormal muscle tone.   Psychiatric:         Thought Content: Thought content normal.         Judgment: Judgment normal.           Diagnostic Test Results:  Labs reviewed in Epic    ASSESSMENT/PLAN:   Sis was seen today for physical.    Diagnoses and all orders for this visit:    Encounter for routine adult health examination without abnormal findings  -     Lipid panel reflex to direct LDL Fasting; Future  -     Basic metabolic panel  (Ca, Cl, CO2, Creat, Gluc, K, Na, BUN); Future  -     CBC with platelets; Future  -     Lipid panel reflex to direct LDL Fasting  -     Basic metabolic panel  (Ca, Cl, CO2, Creat, Gluc, K, Na, BUN)  -     CBC with platelets    Iron deficiency  -     CBC with platelets; Future  -     Ferritin; Future  -     CBC with platelets  -     Ferritin    Special screening for malignant neoplasms, colon  -     Adult Gastro Ref - Procedure Only; Future    Need for hepatitis C screening test    Routine general medical examination at a health care facility    Major depressive  Allergic reaction "disorder, recurrent episode, moderate (H)    HSV (herpes simplex virus) infection  -     valACYclovir (VALTREX) 500 MG tablet; Take 1 tablet (500 mg) by mouth 2 times daily for 3 days For each outbreak.      Preventive care as above  Refill valtrex  HCC updates: MDD well controlled, managed by Psychiatry: Linda Sahni  PHQ 6/26/2019 12/27/2019 8/11/2021   PHQ-9 Total Score 4 2 0   Q9: Thoughts of better off dead/self-harm past 2 weeks Not at all Not at all Not at all           COUNSELING:  Reviewed preventive health counseling, as reflected in patient instructions    Estimated body mass index is 24.2 kg/m  as calculated from the following:    Height as of this encounter: 1.626 m (5' 4\").    Weight as of this encounter: 64 kg (141 lb).        She reports that she has quit smoking. She has never used smokeless tobacco.      Counseling Resources:  ATP IV Guidelines  Pooled Cohorts Equation Calculator  Breast Cancer Risk Calculator  BRCA-Related Cancer Risk Assessment: FHS-7 Tool  FRAX Risk Assessment  ICSI Preventive Guidelines  Dietary Guidelines for Americans, 2010  USDA's MyPlate  ASA Prophylaxis  Lung CA Screening    Michelle Alanis PA-C  [facility] [place]  "

## 2025-03-14 ENCOUNTER — ANCILLARY PROCEDURE (OUTPATIENT)
Dept: MAMMOGRAPHY | Facility: CLINIC | Age: 50
End: 2025-03-14
Payer: COMMERCIAL

## 2025-03-14 DIAGNOSIS — Z12.31 VISIT FOR SCREENING MAMMOGRAM: ICD-10-CM

## 2025-03-14 PROCEDURE — 77063 BREAST TOMOSYNTHESIS BI: CPT | Performed by: RADIOLOGY

## 2025-03-14 PROCEDURE — 77067 SCR MAMMO BI INCL CAD: CPT | Performed by: RADIOLOGY

## 2025-06-15 ENCOUNTER — HEALTH MAINTENANCE LETTER (OUTPATIENT)
Age: 50
End: 2025-06-15